# Patient Record
Sex: FEMALE | Race: BLACK OR AFRICAN AMERICAN | Employment: PART TIME | URBAN - METROPOLITAN AREA
[De-identification: names, ages, dates, MRNs, and addresses within clinical notes are randomized per-mention and may not be internally consistent; named-entity substitution may affect disease eponyms.]

---
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
## 2020-03-11 ENCOUNTER — OFFICE VISIT (OUTPATIENT)
Dept: OBGYN CLINIC | Age: 20
End: 2020-03-11

## 2020-03-11 VITALS
DIASTOLIC BLOOD PRESSURE: 81 MMHG | WEIGHT: 144.6 LBS | HEART RATE: 72 BPM | BODY MASS INDEX: 25.62 KG/M2 | SYSTOLIC BLOOD PRESSURE: 119 MMHG | RESPIRATION RATE: 16 BRPM | HEIGHT: 63 IN

## 2020-03-11 DIAGNOSIS — N92.0 MENORRHAGIA WITH REGULAR CYCLE: ICD-10-CM

## 2020-03-11 DIAGNOSIS — Z30.46 NEXPLANON REMOVAL: Primary | ICD-10-CM

## 2020-03-11 RX ORDER — TRANEXAMIC ACID 650 1/1
1300 TABLET ORAL 3 TIMES DAILY
Qty: 30 TAB | Refills: 12 | Status: SHIPPED | OUTPATIENT
Start: 2020-03-11 | End: 2020-03-16

## 2020-03-11 NOTE — PROGRESS NOTES
23 y.o. for Nexplanon removal.    Patient with Nexplanon placed April 2017. She desires removal because she doesn't like the way it feels and causes her irregular bleeding. Patient declines contraception. Patient with heavy, painful periods prior to placement. Would like to avoid hormones if possible. Mother with endometriosis. Review of symptoms:  Constitutional: negative  Urinary: negative  CV: negative    Neuro: negative  Resp: negative   Psych: negative  GI: negative    Musculoskeletal: negative  GYN: per HPI    Integumentary: negative    Physical exam:    Gen: AOx3, NAD  Resp: No respiratory distress  GYN: Deferred    A/P  23 y.o. for Nexplanon removal.  - nexplanon removed today. - declines contraception.  - will try lysteda for menstrual control. If no improvement after 2-3 cycles, will return to discuss other options.     RTC PRN or for annual exam

## 2020-03-11 NOTE — PROCEDURES
Nexplanon removal    Oral and written consent obtained. Nexplanon was palpated beneath left arm. Area sterilized with betadine. Area anesthetized with 3 ml of 1% plain lidocaine. 5 mm incision made at distal end of device. Device grabbed and removed easily in normal fashion. Hemostasis was excellent. Area cleaned of betadine then bandaged with steristrips. Pressure dressing applied. Patient tolerated the procedure well and there were no complications.

## 2020-07-20 ENCOUNTER — TELEPHONE (OUTPATIENT)
Dept: OBGYN CLINIC | Age: 20
End: 2020-07-20

## 2020-07-20 NOTE — TELEPHONE ENCOUNTER
Called and spoke with patient this afternoon. Advised her per Trenda Lady if spotting gets worse or painful to make earlier US appt and labs. Kaitlin Marshall MD  You 1 minute ago (3:00 PM)       If just light spotting, would observe for now, this is common in first trimester. If anything more than light spotting or she has associated pain, we can possibly get her in for earlier ultrasound and/or beta HCG levels.

## 2020-07-20 NOTE — TELEPHONE ENCOUNTER
Patient called this afternoon. States she is about 2-3 weeks pregnant. She has an appt on 08/17/2020. She states she is spotting but very lightly, didn't notice til she wiped. She wants to know is this normal and if not should she be seen earlier    Please advise.

## 2020-07-30 ENCOUNTER — HOSPITAL ENCOUNTER (EMERGENCY)
Age: 20
Discharge: HOME | End: 2020-07-30
Attending: EMERGENCY MEDICINE | Admitting: EMERGENCY MEDICINE

## 2020-07-30 VITALS
RESPIRATION RATE: 16 BRPM | BODY MASS INDEX: 24.11 KG/M2 | DIASTOLIC BLOOD PRESSURE: 83 MMHG | TEMPERATURE: 98.2 F | SYSTOLIC BLOOD PRESSURE: 128 MMHG | WEIGHT: 131 LBS | OXYGEN SATURATION: 98 % | HEART RATE: 74 BPM | HEIGHT: 62 IN

## 2020-07-30 DIAGNOSIS — N30.00 ACUTE CYSTITIS WITHOUT HEMATURIA: ICD-10-CM

## 2020-07-30 DIAGNOSIS — O26.891 SHORTNESS OF BREATH DUE TO PREGNANCY IN FIRST TRIMESTER: Primary | ICD-10-CM

## 2020-07-30 DIAGNOSIS — R06.02 SHORTNESS OF BREATH DUE TO PREGNANCY IN FIRST TRIMESTER: Primary | ICD-10-CM

## 2020-07-30 LAB
ANION GAP SERPL CALC-SCNC: 11 MMOL/L (ref 5–15)
APPEARANCE UR: ABNORMAL
BACTERIA URNS QL MICRO: ABNORMAL /HPF
BASOPHILS # BLD: 0 K/UL (ref 0–0.1)
BASOPHILS NFR BLD: 1 % (ref 0–1)
BILIRUB UR QL: NEGATIVE
BUN SERPL-MCNC: 9 MG/DL (ref 6–20)
BUN/CREAT SERPL: 11 (ref 12–20)
CALCIUM SERPL-MCNC: 9.1 MG/DL (ref 8.5–10.1)
CHLORIDE SERPL-SCNC: 102 MMOL/L (ref 97–108)
CO2 SERPL-SCNC: 24 MMOL/L (ref 21–32)
COLOR UR: ABNORMAL
CREAT SERPL-MCNC: 0.84 MG/DL (ref 0.55–1.02)
DIFFERENTIAL METHOD BLD: ABNORMAL
EOSINOPHIL # BLD: 0 K/UL (ref 0–0.4)
EOSINOPHIL NFR BLD: 1 % (ref 0–7)
EPITH CASTS URNS QL MICRO: ABNORMAL /LPF
ERYTHROCYTE [DISTWIDTH] IN BLOOD BY AUTOMATED COUNT: 13.2 % (ref 11.5–14.5)
GLUCOSE SERPL-MCNC: 79 MG/DL (ref 65–100)
GLUCOSE UR STRIP.AUTO-MCNC: NEGATIVE MG/DL
HCG SERPL-ACNC: ABNORMAL MIU/ML (ref 0–6)
HCG UR QL: POSITIVE
HCT VFR BLD AUTO: 38.6 % (ref 35–47)
HGB BLD-MCNC: 13.3 G/DL (ref 11.5–16)
HGB UR QL STRIP: NEGATIVE
IMM GRANULOCYTES # BLD AUTO: 0 K/UL (ref 0–0.04)
IMM GRANULOCYTES NFR BLD AUTO: 0 % (ref 0–0.5)
KETONES UR QL STRIP.AUTO: >80 MG/DL
LEUKOCYTE ESTERASE UR QL STRIP.AUTO: ABNORMAL
LYMPHOCYTES # BLD: 2.2 K/UL (ref 0.8–3.5)
LYMPHOCYTES NFR BLD: 35 % (ref 12–49)
MCH RBC QN AUTO: 29.8 PG (ref 26–34)
MCHC RBC AUTO-ENTMCNC: 34.5 G/DL (ref 30–36.5)
MCV RBC AUTO: 86.5 FL (ref 80–99)
MONOCYTES # BLD: 1 K/UL (ref 0–1)
MONOCYTES NFR BLD: 15 % (ref 5–13)
NEUTS SEG # BLD: 3 K/UL (ref 1.8–8)
NEUTS SEG NFR BLD: 48 % (ref 32–75)
NITRITE UR QL STRIP.AUTO: POSITIVE
NRBC # BLD: 0 K/UL (ref 0–0.01)
NRBC BLD-RTO: 0 PER 100 WBC
PH UR STRIP: 6 [PH] (ref 5–8)
PLATELET # BLD AUTO: 265 K/UL (ref 150–400)
PMV BLD AUTO: 10 FL (ref 8.9–12.9)
POTASSIUM SERPL-SCNC: 3.8 MMOL/L (ref 3.5–5.1)
PROT UR STRIP-MCNC: NEGATIVE MG/DL
RBC # BLD AUTO: 4.46 M/UL (ref 3.8–5.2)
RBC #/AREA URNS HPF: ABNORMAL /HPF (ref 0–5)
SODIUM SERPL-SCNC: 137 MMOL/L (ref 136–145)
SP GR UR REFRACTOMETRY: 1.02 (ref 1–1.03)
UA: UC IF INDICATED,UAUC: ABNORMAL
UROBILINOGEN UR QL STRIP.AUTO: 1 EU/DL (ref 0.2–1)
WBC # BLD AUTO: 6.4 K/UL (ref 3.6–11)
WBC URNS QL MICRO: ABNORMAL /HPF (ref 0–4)

## 2020-07-30 PROCEDURE — 36415 COLL VENOUS BLD VENIPUNCTURE: CPT

## 2020-07-30 PROCEDURE — 80048 BASIC METABOLIC PNL TOTAL CA: CPT

## 2020-07-30 PROCEDURE — 81025 URINE PREGNANCY TEST: CPT

## 2020-07-30 PROCEDURE — 74011250636 HC RX REV CODE- 250/636: Performed by: PHYSICIAN ASSISTANT

## 2020-07-30 PROCEDURE — 85025 COMPLETE CBC W/AUTO DIFF WBC: CPT

## 2020-07-30 PROCEDURE — 81001 URINALYSIS AUTO W/SCOPE: CPT

## 2020-07-30 PROCEDURE — 99284 EMERGENCY DEPT VISIT MOD MDM: CPT

## 2020-07-30 PROCEDURE — 84702 CHORIONIC GONADOTROPIN TEST: CPT

## 2020-07-30 RX ORDER — NITROFURANTOIN 25; 75 MG/1; MG/1
100 CAPSULE ORAL 2 TIMES DAILY
Qty: 14 CAP | Refills: 0 | Status: SHIPPED | OUTPATIENT
Start: 2020-07-30 | End: 2020-08-06

## 2020-07-30 RX ADMIN — SODIUM CHLORIDE 1000 ML: 900 INJECTION, SOLUTION INTRAVENOUS at 18:38

## 2020-07-30 NOTE — ED NOTES
Pt presents ambulatory to ED complaining of SOB upon exertion and while laying down to sleep. Pt reports taking 5 home pregnancy tests beginning 3 weeks ago, all positive. Pt denies vaginal discharge, vaginal bleeding, abd pain. Pt is alert and oriented x 4, RR even and unlabored, skin is warm and dry. Assesment completed and pt updated on plan of care. Emergency Department Nursing Plan of Care       The Nursing Plan of Care is developed from the Nursing assessment and Emergency Department Attending provider initial evaluation. The plan of care may be reviewed in the ED Provider note.     The Plan of Care was developed with the following considerations:   Patient / Family readiness to learn indicated by:verbalized understanding  Persons(s) to be included in education: patient  Barriers to Learning/Limitations:No    [address]    7/30/2020   4:57 PM

## 2020-07-30 NOTE — DISCHARGE INSTRUCTIONS
Patient Education        Shortness of Breath: Care Instructions  Your Care Instructions  Shortness of breath has many causes. Sometimes conditions such as anxiety can lead to shortness of breath. Some people get mild shortness of breath when they exercise. Trouble breathing also can be a symptom of a serious problem, such as asthma, lung disease, emphysema, heart problems, and pneumonia. If your shortness of breath continues, you may need tests and treatment. Watch for any changes in your breathing and other symptoms. Follow-up care is a key part of your treatment and safety. Be sure to make and go to all appointments, and call your doctor if you are having problems. It's also a good idea to know your test results and keep a list of the medicines you take. How can you care for yourself at home? · Do not smoke or allow others to smoke around you. If you need help quitting, talk to your doctor about stop-smoking programs and medicines. These can increase your chances of quitting for good. · Get plenty of rest and sleep. · Take your medicines exactly as prescribed. Call your doctor if you think you are having a problem with your medicine. · Find healthy ways to deal with stress. ? Exercise daily. ? Get plenty of sleep. ? Eat regularly and well. When should you call for help? [id number] anytime you think you may need emergency care. For example, call if:  · You have severe shortness of breath. · You have symptoms of a heart attack. These may include:  ? Chest pain or pressure, or a strange feeling in the chest.  ? Sweating. ? Shortness of breath. ? Nausea or vomiting. ? Pain, pressure, or a strange feeling in the back, neck, jaw, or upper belly or in one or both shoulders or arms. ? Lightheadedness or sudden weakness. ? A fast or irregular heartbeat. After you call 911, the  may tell you to chew 1 adult-strength or 2 to 4 low-dose aspirin. Wait for an ambulance.  Do not try to drive yourself. Call your doctor now or seek immediate medical care if:  · Your shortness of breath gets worse or you start to wheeze. Wheezing is a high-pitched sound when you breathe. · You wake up at night out of breath or have to prop your head up on several pillows to breathe. · You are short of breath after only light activity or while at rest.  Watch closely for changes in your health, and be sure to contact your doctor if:  · You do not get better over the next 1 to 2 days. Where can you learn more? Go to http://tomi-grabiel.info/  Enter S780 in the search box to learn more about \"Shortness of Breath: Care Instructions. \"  Current as of: 2020               Content Version: 12.5   [employer]. Care instructions adapted under license by [employer] (which disclaims liability or warranty for this information). If you have questions about a medical condition or this instruction, always ask your healthcare professional. [place] [address] any warranty or liability for your use of this information. Patient Education        Urinary Tract Infection in Pregnancy: Care Instructions  Your Care Instructions     A urinary tract infection, or UTI, is an infection of the bladder and other urinary structures. Most UTIs occur in the bladder. They often cause pain or burning when you urinate. UTI is the most common bacterial infection in pregnancy. If untreated, a UTI could lead to problems such as a kidney infection or  labor. Most UTIs can be cured with antibiotics. Your doctor will prescribe an antibiotic that is safe during pregnancy. Be sure to finish your medicine so that the infection does not spread to your kidneys. Follow-up care is a key part of your treatment and safety. Be sure to make and go to all appointments, and call your doctor if you are having problems.  It's also a good idea to know your test results and keep a list of the medicines you take. How can you care for yourself at home? · Take your antibiotics as directed. Do not stop taking them just because you feel better. You need to take the full course of antibiotics. · Drink extra water and other fluids for the next day or two. This will help wash out the bacteria causing the infection. If you have kidney, heart, or liver disease and have to limit fluids, talk with your doctor before you increase the amount of fluids you drink. · Do not drink alcohol. · Urinate often. Try to empty your bladder each time. Preventing UTIs  · Drink plenty of fluids, enough so that your urine is light yellow or clear like water. This helps you urinate often, which clears bacteria from your system. If you have kidney, heart, or liver disease and have to limit fluids, talk with your doctor before you increase the amount of fluids you drink. · Urinate when you first have the urge. · Urinate right after you have sex. This is the best way for women to avoid UTIs. · When going to the bathroom, wipe from front to back to keep bacteria from entering the vagina or urethra. When should you call for help? Call your doctor now or seek immediate medical care if:  · You have symptoms of a worse urinary tract infection. These may include:  ? Pain or burning when you urinate. ? A frequent need to urinate without being able to pass much urine. ? Pain in the flank, which is just below the rib cage and above the waist on either side of the back. ? Blood in your urine. ? A fever. Watch closely for changes in your health, and be sure to contact your doctor if:  · You do not get better as expected. Where can you learn more? Go to http://tomi-grabiel.info/  Enter M982 in the search box to learn more about \"Urinary Tract Infection in Pregnancy: Care Instructions. \"  Current as of: February 11, 2020               Content Version: 12.5  © 9510-7121 Healthwise, [facility].    Care instructions adapted under license by [employer] (which disclaims liability or warranty for this information). If you have questions about a medical condition or this instruction, always ask your healthcare professional. [address] any warranty or liability for your use of this information.

## 2020-07-30 NOTE — ED NOTES
Patient (s) was given copy of dc instructions and no paper script(s) and one electronic scripts. Patient (s) has verbalized understanding of instructions and script (s). Patient was given a current medication reconciliation form and verbalized understanding of their medications. Patient (s) has verbalized understanding of the importance of discussing medications with  his or her physician or clinic they will be following up with. Patient alert and oriented and in no acute distress. Patient offered wheelchair from treatment area to hospital entrance, patient declined wheelchair. Patient left ED with family.

## 2020-07-30 NOTE — ED TRIAGE NOTES
Pt reports she took a home pregnancy test that showed positive. Pt has not been able to follow up with OBGYN yet, unknown how far along. Pt reports increased SOB with exertion. Pt denies pain at this time. No obvious distress. First pregnancy for patient. Pt denies discharge/bleeding/cramping.

## 2020-07-30 NOTE — ED PROVIDER NOTES
EMERGENCY DEPARTMENT HISTORY AND PHYSICAL EXAM    Date: 7/30/2020  Patient Name: Cornell Clifford    History of Presenting Illness     Chief Complaint   Patient presents with    Shortness of Breath    Pregnancy Problem         History Provided By: Patient    HPI: Cornell Clifford is a 21 y.o. female with No significant past medical history who presents with shortness of breath x2 days associated with exertion and worse with lying down to sleep. Patient also reports taking several home pregnancy test about 3 weeks ago all of which were positive. Patient unsure last menstrual as her periods have been irregular and this is her first pregnancy. Patient has not seen OB yet but does have an initial appointment coming up. Patient denies any chest pain, cough, fever, chills, abdominal pain, vaginal bleeding or discharge. Patient does report associated nausea and vomiting with the pregnancy. Patient has not taken anything for symptoms. Patient states she used to smoke cigarettes but stopped once finding out that she was pregnant. PCP: None    Current Outpatient Medications   Medication Sig Dispense Refill    nitrofurantoin, macrocrystal-monohydrate, (Macrobid) 100 mg capsule Take 1 Cap by mouth two (2) times a day for 7 days. 14 Cap 0       Past History     Past Medical History:  Past Medical History:   Diagnosis Date    Bronchitis     10-15 years ago       Past Surgical History:  History reviewed. No pertinent surgical history. Family History:  History reviewed. No pertinent family history. Social History:  Social History     Tobacco Use    Smoking status: Never Smoker    Smokeless tobacco: Never Used   Substance Use Topics    Alcohol use: No    Drug use: Not Currently       Allergies:  No Known Allergies      Review of Systems   Review of Systems   Constitutional: Negative for chills and fever. Respiratory: Positive for shortness of breath. Negative for cough and wheezing.     Gastrointestinal: Negative for nausea and vomiting. Allergic/Immunologic: Negative for immunocompromised state. Neurological: Negative for speech difficulty and weakness. All other systems reviewed and are negative. Physical Exam     Vitals:    07/30/20 1626 07/30/20 1840 07/30/20 1842 07/30/20 1855   BP: (!) 122/92 128/83 128/83    Pulse: 76  74    Resp: 18  16    Temp: 98.2 °F (36.8 °C)      SpO2: 100%  100% 98%   Weight: 59.4 kg (131 lb)      Height: 5' 2\" (1.575 m)        Physical Exam  Vitals signs and nursing note reviewed. Constitutional:       General: She is not in acute distress. Appearance: She is well-developed. HENT:      Head: Normocephalic and atraumatic. Eyes:      Conjunctiva/sclera: Conjunctivae normal.   Cardiovascular:      Rate and Rhythm: Normal rate and regular rhythm. Heart sounds: Normal heart sounds. Pulmonary:      Effort: Pulmonary effort is normal. No tachypnea, bradypnea, accessory muscle usage or respiratory distress. Breath sounds: Normal breath sounds. No stridor. No decreased breath sounds, wheezing, rhonchi or rales. Skin:     General: Skin is warm and dry. Neurological:      Mental Status: She is alert and oriented to person, place, and time. Psychiatric:         Behavior: Behavior normal.         Thought Content:  Thought content normal.         Judgment: Judgment normal.           Diagnostic Study Results     Labs -     Recent Results (from the past 12 hour(s))   HCG URINE, QL. - POC    Collection Time: 07/30/20  5:03 PM   Result Value Ref Range    Pregnancy test,urine (POC) Positive (A) NEG     URINALYSIS W/ REFLEX CULTURE    Collection Time: 07/30/20  5:09 PM    Specimen: Urine   Result Value Ref Range    Color YELLOW/STRAW      Appearance CLOUDY (A) CLEAR      Specific gravity 1.025 1.003 - 1.030      pH (UA) 6.0 5.0 - 8.0      Protein Negative NEG mg/dL    Glucose Negative NEG mg/dL    Ketone >80 (A) NEG mg/dL    Bilirubin Negative NEG      Blood Negative NEG      Urobilinogen 1.0 0.2 - 1.0 EU/dL    Nitrites Positive (A) NEG      Leukocyte Esterase MODERATE (A) NEG      WBC 5-10 0 - 4 /hpf    RBC 0-5 0 - 5 /hpf    Epithelial cells MODERATE (A) FEW /lpf    Bacteria 4+ (A) NEG /hpf    UA:UC IF INDICATED CULTURE NOT INDICATED BY UA RESULT CNI     CBC WITH AUTOMATED DIFF    Collection Time: 07/30/20  5:38 PM   Result Value Ref Range    WBC 6.4 3.6 - 11.0 K/uL    RBC 4.46 3.80 - 5.20 M/uL    HGB 13.3 11.5 - 16.0 g/dL    HCT 38.6 35.0 - 47.0 %    MCV 86.5 80.0 - 99.0 FL    MCH 29.8 26.0 - 34.0 PG    MCHC 34.5 30.0 - 36.5 g/dL    RDW 13.2 11.5 - 14.5 %    PLATELET 494 394 - 371 K/uL    MPV 10.0 8.9 - 12.9 FL    NRBC 0.0 0  WBC    ABSOLUTE NRBC 0.00 0.00 - 0.01 K/uL    NEUTROPHILS 48 32 - 75 %    LYMPHOCYTES 35 12 - 49 %    MONOCYTES 15 (H) 5 - 13 %    EOSINOPHILS 1 0 - 7 %    BASOPHILS 1 0 - 1 %    IMMATURE GRANULOCYTES 0 0.0 - 0.5 %    ABS. NEUTROPHILS 3.0 1.8 - 8.0 K/UL    ABS. LYMPHOCYTES 2.2 0.8 - 3.5 K/UL    ABS. MONOCYTES 1.0 0.0 - 1.0 K/UL    ABS. EOSINOPHILS 0.0 0.0 - 0.4 K/UL    ABS. BASOPHILS 0.0 0.0 - 0.1 K/UL    ABS. IMM.  GRANS. 0.0 0.00 - 0.04 K/UL    DF AUTOMATED     METABOLIC PANEL, BASIC    Collection Time: 07/30/20  5:38 PM   Result Value Ref Range    Sodium 137 136 - 145 mmol/L    Potassium 3.8 3.5 - 5.1 mmol/L    Chloride 102 97 - 108 mmol/L    CO2 24 21 - 32 mmol/L    Anion gap 11 5 - 15 mmol/L    Glucose 79 65 - 100 mg/dL    BUN 9 6 - 20 MG/DL    Creatinine 0.84 0.55 - 1.02 MG/DL    BUN/Creatinine ratio 11 (L) 12 - 20      GFR est AA >60 >60 ml/min/1.73m2    GFR est non-AA >60 >60 ml/min/1.73m2    Calcium 9.1 8.5 - 10.1 MG/DL   BETA HCG, QT    Collection Time: 07/30/20  5:38 PM   Result Value Ref Range    Beta HCG, QT 28,642 (H) 0 - 6 MIU/ML       Radiologic Studies -   No orders to display     CT Results  (Last 48 hours)    None        CXR Results  (Last 48 hours)    None            Medical Decision Making   I am the first provider for this patient. I reviewed the vital signs, available nursing notes, past medical history, past surgical history, family history and social history. Vital Signs-Reviewed the patient's vital signs. Records Reviewed: Old Medical Records    ED Course as of Jul 30 1938 Thu Jul 30, 2020 2344 Discussed lab results with patient and advised of low likelihood of PE in the differential.  Discussed risk and benefits of getting a CTA while pregnant. Patient offered CTA now while in ED or she can return should symptoms worsen. Patient opts to not get CTA at this time but will return should shortness of breath get worse. Patient does report some improvement in SOB since being in ED. We will plan for discharge once IVF finish.    [AH]      ED Course User Index  [AH] Theresa Grace PA-C          Disposition:  Discharged    DISCHARGE NOTE:   1934      Care plan outlined and precautions discussed. Patient has no new complaints, changes, or physical findings. Results of labs and UA were reviewed with the patient. All medications were reviewed with the patient; will d/c home. All of pt's questions and concerns were addressed. Patient was instructed and agrees to follow up with OB, as well as to return to the ED upon further deterioration. Patient is ready to go home. Follow-up Information     Follow up With Specialties Details Why [address] [facility] EMERGENCY DEPT Emergency Medicine  If symptoms worsen [facility]    [address] [facility] [address] [facility],4[facility] [address]  [address]   [phone]          Current Discharge Medication List      START taking these medications    Details   nitrofurantoin, macrocrystal-monohydrate, (Macrobid) 100 mg capsule Take 1 Cap by mouth two (2) times a day for 7 days.   Qty: 14 Cap, Refills: 0         STOP taking these medications       ibuprofen (MOTRIN) 600 mg tablet Comments:   Reason for Stopping:               Provider Notes (Medical Decision Making):   Patient presents with SOB. DDx: Asthma PE, PNA, anemia, Anxiety, low concern for COVID-19. Will get labs and UA but no imaging at this time as patient is pregnant    Procedures:  Procedures    Please note that this dictation was completed with Dragon, computer voice recognition software. Quite often unanticipated grammatical, syntax, homophones, and other interpretive errors are inadvertently transcribed by the computer software. Please disregard these errors. Additionally, please excuse any errors that have escaped final proofreading. Diagnosis     Clinical Impression:   1. Shortness of breath due to pregnancy in first trimester    2.  Acute cystitis without hematuria

## 2020-07-30 NOTE — ED NOTES
Patient ambulated with pulse ox. Pt reports some shortness of breath but her pulse ox stayed around  most of the time and briefly went to 95% on room air.

## 2020-07-30 NOTE — ED NOTES
Bedside and Verbal shift change report given to Jorge L Altman RN  (oncoming nurse) by Tripp Sanchez RN  (offgoing nurse). Report included the following information SBAR, ED Summary, MAR and Recent Results.

## 2020-08-17 ENCOUNTER — OFFICE VISIT (OUTPATIENT)
Dept: OBGYN CLINIC | Age: 20
End: 2020-08-17

## 2020-08-17 VITALS
WEIGHT: 138 LBS | BODY MASS INDEX: 24.45 KG/M2 | HEIGHT: 63 IN | SYSTOLIC BLOOD PRESSURE: 120 MMHG | DIASTOLIC BLOOD PRESSURE: 76 MMHG

## 2020-08-17 DIAGNOSIS — O20.0 THREATENED ABORTION: Primary | ICD-10-CM

## 2020-08-17 PROCEDURE — 76801 OB US < 14 WKS SINGLE FETUS: CPT | Performed by: OBSTETRICS & GYNECOLOGY

## 2020-08-17 PROCEDURE — 99213 OFFICE O/P EST LOW 20 MIN: CPT | Performed by: OBSTETRICS & GYNECOLOGY

## 2020-08-17 NOTE — PATIENT INSTRUCTIONS
Threatened Miscarriage: Care Instructions Your Care Instructions Some women have light spotting or bleeding during the first 12 weeks of pregnancy. In some cases this is normal. Light spotting or bleeding can also be a sign of a possible loss of the pregnancy. This is called a threatened miscarriage. At this point, the doctor may not be able to tell if your vaginal bleeding is normal or is a sign of a miscarriage. In early pregnancy, things such as stress, exercise, and sex do not cause miscarriage. You may be worried or upset about the possibility of losing your pregnancy. But do not blame yourself. There is no treatment to stop a threatened miscarriage. If you do have a miscarriage, there was nothing you could have done to prevent it. A miscarriage usually means that the pregnancy is not developing normally. The doctor has checked you carefully, but problems can develop later. If you notice any problems or new symptoms, get medical treatment right away. Follow-up care is a key part of your treatment and safety. Be sure to make and go to all appointments, and call your doctor if you are having problems. It's also a good idea to know your test results and keep a list of the medicines you take. How can you care for yourself at home? · If you do have a miscarriage, you will probably have some vaginal bleeding for 1 to 2 weeks. Use pads instead of tampons. · Take acetaminophen (Tylenol) for cramps. Read and follow all instructions on the label. · Do not take two or more pain medicines at the same time unless the doctor told you to. Many pain medicines have acetaminophen, which is Tylenol. Too much acetaminophen (Tylenol) can be harmful. · Do not have sex until your doctor says it is okay. · Get lots of rest over the next several days. · You may do your normal activities if you feel well enough to do them. But do not do any heavy exercise until your doctor says it is okay. · Eat a balanced diet that is high in iron and vitamin C. Foods rich in iron include red meat, shellfish, eggs, beans, and leafy green vegetables. Foods high in vitamin C include citrus fruits, tomatoes, and broccoli. Talk to your doctor about whether you need to take iron pills or a multivitamin. · Do not drink alcohol or use tobacco or illegal drugs. · Do not smoke. If you need help quitting, talk to your doctor about stop-smoking programs and medicines. These can increase your chances of quitting for good. When should you call for help? [id number] anytime you think you may need emergency care. For example, call if: 
· You passed out (lost consciousness). Call your doctor now or seek immediate medical care if: 
· You have severe vaginal bleeding. · You are dizzy or lightheaded, or you feel like you may faint. · You have new or worse pain in your belly or pelvis. · You have a fever. · You have vaginal discharge that smells bad. Watch closely for changes in your health, and be sure to contact your doctor if: 
· You do not get better as expected. Where can you learn more? Go to http://www.gray.com/ Enter E287 in the search box to learn more about \"Threatened Miscarriage: Care Instructions. \" Current as of: February 11, 2020               Content Version: 12.5 © 3797-5876 [employer]wise[employer]. Care instructions adapted under license by [employer] (which disclaims liability or warranty for this information). If you have questions about a medical condition or this instruction, always ask your healthcare professional. [place] [address] any warranty or liability for your use of this information.

## 2020-08-17 NOTE — PROGRESS NOTES
Initial OB Problem Visit    Rudi Keating is a 21 y.o. at [address] by LMP measuring 6 weeks by ultrasound (with no FHTs) presenting for initial OB visit. Pt with slight spotting last week, also with some N/V. But otherwise doing well. Beta HCG 03,442 on 8/1/20 at [address] where she presented for SOB. TV ULTRASOUND PERFORMED  A SINGLE NONVIABLE 6W0D IUP IS SEEN WITH ABSENT CARDIAC RHYTHM. POSSIBILITY OF SECOND FETUS  TOO EARLY TO DETERMINE GESTATIONAL AGE, WITH NO FETAL HEART TONES. CLINICAL COORELATION  RECOMMENDED. GESTATIONAL AGE BASED ON TODAYS ULTRASOUND. A NORMAL YOLK SAC IS SEEN. RIGHT OVARY APPEARS WITHIN NORMAL LIMITS. LEFT OVARY APPEARS WITHIN NORMAL LIMITS. NO FREE FLUID IS SEEN IN THE CDS. Pregnancy symptoms:  -N/V?yes  -breast tenderness?denies  -fatigue?denies  -cramping?denies  -weight change?denies  -Vaginal bleeding?spotting last wednesday    Ob/Gyn Hx:  G1   LMP- Date: 6/16/20 sure  Menstrual pattern prior to conception:irregular  Contraception at time of conception?none, nexplanon removed 3/11/20  Date of 1st positive UPT:3 weeks ago  STI-denies    Health maintenance:  Pap-not indicated  Gardasil-denies     Past Medical History:   Diagnosis Date    Bronchitis     10-15 years ago     No past surgical history on file. No family history on file.   Social History     Socioeconomic History    Marital status: SINGLE     Spouse name: Not on file    Number of children: Not on file    Years of education: Not on file    Highest education level: Not on file   Occupational History    Not on file   Social Needs    Financial resource strain: Not on file    Food insecurity     Worry: Not on file     Inability: Not on file    Transportation needs     Medical: Not on file     Non-medical: Not on file   Tobacco Use    Smoking status: Never Smoker    Smokeless tobacco: Never Used   Substance and Sexual Activity    Alcohol use: No    Drug use: Not Currently    Sexual activity: Yes     Birth control/protection: Inserts   Lifestyle    Physical activity     Days per week: Not on file     Minutes per session: Not on file    Stress: Not on file   Relationships    Social connections     Talks on phone: Not on file     Gets together: Not on file     Attends Baptism service: Not on file     Active member of club or organization: Not on file     Attends meetings of clubs or organizations: Not on file     Relationship status: Not on file    Intimate partner violence     Fear of current or ex partner: Not on file     Emotionally abused: Not on file     Physically abused: Not on file     Forced sexual activity: Not on file   Other Topics Concern    Not on file   Social History Narrative    Not on file         No Known Allergies      Review of Systems - History obtained from the patient  Constitutional: negative for weight loss, fever, night sweats  HEENT: negative for hearing loss, earache, congestion, snoring, sorethroat  CV: negative for chest pain, palpitations, edema  Resp: negative for cough, shortness of breath, wheezing  GI: negative for change in bowel habits, abdominal pain, black or bloody stools  : negative for frequency, dysuria, hematuria, vaginal discharge, +spotting  MSK: negative for back pain, joint pain, muscle pain  Breast: negative for breast lumps, nipple discharge, galactorrhea  Skin :negative for itching, rash, hives  Neuro: negative for dizziness, headache, confusion, weakness  Psych: negative for anxiety, depression, change in mood  Heme/lymph: negative for bleeding, bruising, pallor    Physical Exam  Visit Vitals  /76 (BP 1 Location: Left arm, BP Patient Position: Sitting)   Ht 5' 3\" (1.6 m)   Wt 138 lb (62.6 kg)   LMP 06/16/2020   BMI 24.45 kg/m²     Constitutional  · Appearance: well-nourished, well developed, alert, in no acute distress    HENT  · Head and Face: appears normal    Neck  · Inspection/Palpation: normal appearance, no masses or tenderness  · Lymph Nodes: no lymphadenopathy present  · Thyroid: gland size normal, nontender, no nodules or masses present on palpation    Chest  · Respiratory Effort: non-labored breathing  · Auscultation: CTAB, normal breath sounds    Cardiovascular  · Heart:  · Auscultation: regular rate and rhythm without murmur  · Extremities: no peripheral edema    Gastrointestinal  · Abdominal Examination: abdomen non-tender to palpation, normal bowel sounds, no masses present  · Liver and spleen: no hepatomegaly present, spleen not palpable  · Hernias: no hernias identified    Skin  · General Inspection: no rash, no lesions identified    Neurologic/Psychiatric  · Mental Status:  · Orientation: grossly oriented to person, place and time  · Mood and Affect: mood normal, affect appropriate      Assessment/Plan:  22 yo G1 with suspected missed ab, but not yet meeting diagnostic criteria for failed pregnancy, thus cannot definitively exclude earlier gestation than expected.     -beta HCG, T&S  -repeat US in 2 wks  -discussed concern for pregnancy failure in this patient  -provided specimen collection supplies in event pt spontaneously passes pregnancy  -bleeding/pain/infx precautions    RTC 2 wk    Danii Lopez MD  8/17/2020  2:14 PM

## 2020-08-18 LAB
ABO GROUP BLD: NORMAL
HCG INTACT+B SERPL-ACNC: NORMAL MIU/ML
RH BLD: POSITIVE

## 2020-08-18 NOTE — PROGRESS NOTES
HCG increased from prior assessment, but still not as much as we might expect, still suspicion for viable pregnancy low, but we will repeat ultrasound in 2 weeks as scheduled to be sure.

## 2020-08-26 ENCOUNTER — TELEPHONE (OUTPATIENT)
Dept: OBGYN CLINIC | Age: 20
End: 2020-08-26

## 2020-08-26 NOTE — TELEPHONE ENCOUNTER
Call received at 348PM    21year old patient seen in the office on 8/17/2020 for threatened ab      Patient calling today to ask for a note for work due to not going to work on 8/19-8-20 due to mild cramping and light spotting. Patient states she was told to take it easy and not do any thing strenuous. Patient reports she continues to have off and on spotting. Patient did go back to work    ? Ok to write the letter.     Please advise

## 2020-08-26 NOTE — TELEPHONE ENCOUNTER
MD Maria E Joe, Clarence Gallagher, RN    Caller: Unspecified (Today,  2:48 PM)               Okay to write note, and please also schedule pt for repeat US and follow up visit in 1 week. Thanks        Letter composed, printed, signed and scanned into chart. Letter was sent to patient as attachment to her my chart.       Patient placed on the schedule for ultrasound on 9/3/2020 at 12:30pM and then follow up at 1:00PM    Please confirm that this is ok ( double booked)    Please advised so this nurse can confirm- with patient

## 2020-08-26 NOTE — LETTER
8/26/2020 4:27 PM 
 
Ms. Cornell Loza[address] To whom it may concern: Ms Milana Dickey is under my care and may be excused from work from 8/19- 8/22/2020 . If you have any further questions , placed have the patient to contact the office at [phone]. Sincerely, Shahla Sotomayor MD

## 2020-08-27 NOTE — TELEPHONE ENCOUNTER
MD Maria E Longo Earlean Antonio, RN    Caller: Unspecified (Yesterday,  2:48 PM)               Yes, okay to double book. This nurse attempted to reach the patient and left a detailed message confirming the appointments scheduled for 9/3/2020 at 12:30PM for ultrasound and then follow up at 1:00PM    Patient verbalized understanding.

## 2020-09-15 ENCOUNTER — HOSPITAL ENCOUNTER (EMERGENCY)
Age: 20
Discharge: HOME | End: 2020-09-15
Attending: EMERGENCY MEDICINE | Admitting: EMERGENCY MEDICINE
Payer: COMMERCIAL

## 2020-09-15 VITALS
HEART RATE: 82 BPM | BODY MASS INDEX: 25.69 KG/M2 | SYSTOLIC BLOOD PRESSURE: 128 MMHG | HEIGHT: 63 IN | TEMPERATURE: 98.9 F | RESPIRATION RATE: 16 BRPM | WEIGHT: 145 LBS | DIASTOLIC BLOOD PRESSURE: 85 MMHG | OXYGEN SATURATION: 99 %

## 2020-09-15 DIAGNOSIS — N30.01 ACUTE CYSTITIS WITH HEMATURIA: Primary | ICD-10-CM

## 2020-09-15 DIAGNOSIS — O03.4 RETAINED PRODUCTS OF CONCEPTION AFTER MISCARRIAGE: ICD-10-CM

## 2020-09-15 LAB
APPEARANCE UR: ABNORMAL
BACTERIA URNS QL MICRO: ABNORMAL /HPF
BILIRUB UR QL: NEGATIVE
COLOR UR: ABNORMAL
EPITH CASTS URNS QL MICRO: ABNORMAL /LPF
GLUCOSE UR STRIP.AUTO-MCNC: NEGATIVE MG/DL
HCG UR QL: POSITIVE
HGB UR QL STRIP: ABNORMAL
KETONES UR QL STRIP.AUTO: 40 MG/DL
LEUKOCYTE ESTERASE UR QL STRIP.AUTO: NEGATIVE
NITRITE UR QL STRIP.AUTO: POSITIVE
PH UR STRIP: 6 [PH] (ref 5–8)
PROT UR STRIP-MCNC: >300 MG/DL
RBC #/AREA URNS HPF: >100 /HPF (ref 0–5)
SP GR UR REFRACTOMETRY: 1.02 (ref 1–1.03)
UA: UC IF INDICATED,UAUC: ABNORMAL
UROBILINOGEN UR QL STRIP.AUTO: 1 EU/DL (ref 0.2–1)
WBC URNS QL MICRO: ABNORMAL /HPF (ref 0–4)

## 2020-09-15 PROCEDURE — 99284 EMERGENCY DEPT VISIT MOD MDM: CPT

## 2020-09-15 PROCEDURE — 81025 URINE PREGNANCY TEST: CPT

## 2020-09-15 PROCEDURE — 81001 URINALYSIS AUTO W/SCOPE: CPT

## 2020-09-15 RX ORDER — CEPHALEXIN 500 MG/1
500 CAPSULE ORAL 2 TIMES DAILY
Qty: 10 CAP | Refills: 0 | Status: SHIPPED | OUTPATIENT
Start: 2020-09-15 | End: 2021-09-11

## 2020-09-15 NOTE — ED NOTES
Discharge instructions were given to the patient by Gaudencio Bolton RN. The patient left the Emergency Department ambulatory, alert and oriented and in no acute distress with 0 prescriptions. The patient was encouraged to call or return to the ED for worsening issues or problems and was encouraged to schedule a follow up appointment for continuing care. The patient verbalized understanding of discharge instructions and prescriptions, all questions were answered. The patient has no further concerns at this time.

## 2020-09-15 NOTE — ED PROVIDER NOTES
EMERGENCY DEPARTMENT HISTORY AND PHYSICAL EXAM      Date: 9/15/2020  Patient Name: Karlee Theodore    History of Presenting Illness     Chief Complaint   Patient presents with    Vaginal Bleeding       History Provided By: Patient    HPI: Karlee Theodore, 21 y.o. female presents ambulatory to the ED with cc of mild to moderate vaginal bleeding since today. Pt reports passing clots. States had a miscarriage 2 weeks ago. Denies any associated symptoms. Denies any alleviating or exacerbating factors. States she has an appointment with her OBGYN tomorrow at 8 AM. Pt specifically denies any fever, chills, CP, SOB, abd pain, NVD. There are no other complaints, changes, or physical findings at this time. PCP: None    No current facility-administered medications on file prior to encounter. Current Outpatient Medications on File Prior to Encounter   Medication Sig Dispense Refill    PNV Comb #2-Iron-Omega 3-FA [id number] mg cmpk Take  by mouth. Past History     Past Medical History:  Past Medical History:   Diagnosis Date     2020    PO    Bronchitis     10-15 years ago       Past Surgical History:  Past Surgical History:   Procedure Laterality Date    HX WISDOM TEETH EXTRACTION         Family History:  Family History   Problem Relation Age of Onset    No Known Problems Mother     No Known Problems Father        Social History:  Social History     Tobacco Use    Smoking status: Never Smoker    Smokeless tobacco: Never Used   Substance Use Topics    Alcohol use: No    Drug use: Not Currently       Allergies:  No Known Allergies      Review of Systems   Review of Systems   Constitutional: Negative for fever. HENT: Negative for sore throat. Eyes: Negative for photophobia and redness. Respiratory: Negative for shortness of breath and wheezing. Cardiovascular: Negative for chest pain and leg swelling.    Gastrointestinal: Negative for abdominal pain, blood in stool, nausea and vomiting. Genitourinary: Positive for vaginal bleeding. Negative for difficulty urinating, dysuria, hematuria and menstrual problem. Musculoskeletal: Negative for back pain and joint swelling. Neurological: Negative for dizziness, seizures, syncope, speech difficulty, weakness, numbness and headaches. Hematological: Negative for adenopathy. Psychiatric/Behavioral: Negative for agitation, confusion and suicidal ideas. The patient is not nervous/anxious. Physical Exam   Physical Exam  Vitals signs and nursing note reviewed. Constitutional:       General: She is not in acute distress. Appearance: She is well-developed. HENT:      Head: Normocephalic and atraumatic. Eyes:      Conjunctiva/sclera: Conjunctivae normal.      Pupils: Pupils are equal, round, and reactive to light. Neck:      Musculoskeletal: Normal range of motion and neck supple. Cardiovascular:      Rate and Rhythm: Normal rate and regular rhythm. Heart sounds: Normal heart sounds. Pulmonary:      Effort: Pulmonary effort is normal. No respiratory distress. Breath sounds: Normal breath sounds. No wheezing. Abdominal:      General: Bowel sounds are normal. There is no distension. Palpations: Abdomen is soft. Tenderness: There is no abdominal tenderness. Musculoskeletal: Normal range of motion. General: No tenderness. Skin:     General: Skin is warm and dry. Findings: No rash. Neurological:      Mental Status: She is alert and oriented to person, place, and time. Cranial Nerves: No cranial nerve deficit.    Psychiatric:         Behavior: Behavior normal.         Diagnostic Study Results     Labs -     Recent Results (from the past 12 hour(s))   URINALYSIS W/ REFLEX CULTURE    Collection Time: 09/15/20  6:31 PM    Specimen: Urine   Result Value Ref Range    Color RED      Appearance CLOUDY (A) CLEAR      Specific gravity 1.025 1.003 - 1.030      pH (UA) 6.0 5.0 - 8.0      Protein >300 (A) NEG mg/dL    Glucose Negative NEG mg/dL    Ketone 40 (A) NEG mg/dL    Bilirubin Negative NEG      Blood LARGE (A) NEG      Urobilinogen 1.0 0.2 - 1.0 EU/dL    Nitrites Positive (A) NEG      Leukocyte Esterase Negative NEG      WBC 0-4 0 - 4 /hpf    RBC >100 (H) 0 - 5 /hpf    Epithelial cells FEW FEW /lpf    Bacteria 3+ (A) NEG /hpf    UA:UC IF INDICATED CULTURE NOT INDICATED BY UA RESULT CNI     HCG URINE, QL. - POC    Collection Time: 09/15/20  6:33 PM   Result Value Ref Range    Pregnancy test,urine (POC) Positive (A) NEG         Medical Decision Making   I am the first provider for this patient. I reviewed the vital signs, available nursing notes, past medical history, past surgical history, family history and social history. Vital Signs-Reviewed the patient's vital signs. Patient Vitals for the past 12 hrs:   Temp Pulse Resp BP SpO2   09/15/20 1802 98.9 °F (37.2 °C) 85 16 135/88 100 %       Records Reviewed: Nursing Notes and Old Medical Records    Provider Notes (Medical Decision Making):   DDx: DUB, retained products of conception    ED Course:   Initial assessment performed. The patients presenting problems have been discussed, and they are in agreement with the care plan formulated and outlined with them. I have encouraged them to ask questions as they arise throughout their visit. Critical Care Time:   none    Disposition:  DISCHARGE  6:54 PM  The patient has been re-evaluated and is ready for discharge. Reviewed available results with patient. Counseled pt on diagnosis and care plan. Pt has expressed understanding, and all questions have been answered. Pt agrees with plan and agrees to follow up as recommended, or return to the ED if their symptoms worsen. Discharge instructions have been provided and explained to the pt, along with reasons to return to the ED. PLAN:  1.    Current Discharge Medication List      START taking these medications    Details   cephALEXin (Keflex) 500 mg capsule Take 1 Cap by mouth two (2) times a day. Qty: 10 Cap, Refills: 0           2. Follow-up Information     Follow up With Specialties Details Why [address] [facility] EMERGENCY DEPT Emergency Medicine  If symptoms worsen [address]        Return to ED if worse     Diagnosis     Clinical Impression:   1. Acute cystitis with hematuria    2. Retained products of conception after miscarriage        Attestations: This note is prepared by Melchor See, acting as Scribe for Debbie Gomez MD.    Debbie Gomez MD: The scribe's documentation has been prepared under my direction and personally reviewed by me in its entirety. I confirm that the note above accurately reflects all work, treatment, procedures, and medical decision making performed by me.

## 2020-09-15 NOTE — ED NOTES
Pt presents to ED ambulatory complaining of lower abdominal pain bright red vaginal bleeding with 3 small clots today. Pt reports pill induced  2 wks ago. Pt reports appt with obgyn tomorrow. Pt denies dysuria, dizziness, n/v, fever, chills. Pt is alert and oriented x 4, RR even and unlabored, skin is warm and dry. Assessment completed and pt updated on plan of care. Call bell in reach. Emergency Department Nursing Plan of Care       The Nursing Plan of Care is developed from the Nursing assessment and Emergency Department Attending provider initial evaluation. The plan of care may be reviewed in the ED Provider note.     The Plan of Care was developed with the following considerations:   Patient / Family readiness to learn indicated by:verbalized understanding  Persons(s) to be included in education: patient  Barriers to Learning/Limitations:No    Signed     Digna Beltran    9/15/2020   6:21 PM

## 2020-09-15 NOTE — ED TRIAGE NOTES
Patient presents to the ED with c/o vaginal bleeding after a miscarriage x2 weeks. Pt reports that she has passed three large clots today. Pt reports that she took a medication to have the miscarriage. Pt reports abdominal cramping.

## 2020-09-17 NOTE — PERIOP NOTES
[facility]  Preoperative Instructions        Surgery Date 9/18/20          Time of Arrival 1000    1. On the day of your surgery, please report to the Surgical Services Registration Desk and sign in at your designated time. The Surgery Center is located to the right of the Emergency Room. 2. You must have someone with you to drive you home. You should not drive a car for 24 hours following surgery. Please make arrangements for a friend or family member to stay with you for the first 24 hours after your surgery. 3. Do not have anything to eat or drink (including water, gum, mints, coffee, juice) after midnight ? 9/17/20? Daniel Angry ? This may not apply to medications prescribed by your physician. ?(Please note below the special instructions with medications to take the morning of your procedure.)    4. We recommend you do not drink any alcoholic beverages for 24 hours before and after your surgery. 5. Contact your surgeons office for instructions on the following medications: non-steroidal anti-inflammatory drugs (i.e. Advil, Aleve), vitamins, and supplements. (Some surgeons will want you to stop these medications prior to surgery and others may allow you to take them)  **If you are currently taking Plavix, Coumadin, Aspirin and/or other blood-thinning agents, contact your surgeon for instructions. ** Your surgeon will partner with the physician prescribing these medications to determine if it is safe to stop or if you need to continue taking. Please do not stop taking these medications without instructions from your surgeon    6. Wear comfortable clothes. Wear glasses instead of contacts. Do not bring any money or jewelry. Please bring picture ID, insurance card, and any prearranged co-payment or hospital payment. Do not wear make-up, particularly mascara the morning of your surgery. Do not wear nail polish, particularly if you are having foot /hand surgery.   Wear your hair loose or down, no ponytails, buns, jenise pins or clips. All body piercings must be removed. Please shower with antibacterial soap for three consecutive days before and on the morning of surgery, but do not apply any lotions, powders or deodorants after the shower on the day of surgery. Please use a fresh towels after each shower. Please sleep in clean clothes and change bed linens the night before surgery. Please do not shave for 48 hours prior to surgery. Shaving of the face is acceptable. 7. You should understand that if you do not follow these instructions your surgery may be cancelled. If your physical condition changes (I.e. fever, cold or flu) please contact your surgeon as soon as possible. 8. It is important that you be on time. If a situation occurs where you may be late, please call [phone] (OR Holding Area). 9. If you have any questions and or problems, please call [phone] (Pre-admission Testing). 10. Your surgery time may be subject to change. You will receive a phone call the evening prior if your time changes. 11.  If having outpatient surgery, you must have someone to drive you here, stay with you during the duration of your stay, and to drive you home at time of discharge. Special Instructions:     TAKE ALL MEDICATIONS DAY OF SURGERY EXCEPT:  none    I understand a pre-operative phone call will be made to verify my surgery time. In the event that I am not available, I give permission for a message to be left on my answering service and/or with another person?   Yes           ___________________      __________   _________    (Signature of Patient)             (Witness)                (Date and Time)

## 2020-09-18 ENCOUNTER — ANESTHESIA EVENT (OUTPATIENT)
Dept: SURGERY | Age: 20
End: 2020-09-18
Payer: COMMERCIAL

## 2020-09-18 ENCOUNTER — ANESTHESIA (OUTPATIENT)
Dept: SURGERY | Age: 20
End: 2020-09-18
Payer: COMMERCIAL

## 2020-09-18 ENCOUNTER — HOSPITAL ENCOUNTER (OUTPATIENT)
Age: 20
Setting detail: OUTPATIENT SURGERY
Discharge: HOME | End: 2020-09-18
Attending: OBSTETRICS & GYNECOLOGY | Admitting: OBSTETRICS & GYNECOLOGY
Payer: COMMERCIAL

## 2020-09-18 VITALS
HEART RATE: 60 BPM | BODY MASS INDEX: 25.16 KG/M2 | HEIGHT: 63 IN | RESPIRATION RATE: 16 BRPM | DIASTOLIC BLOOD PRESSURE: 64 MMHG | SYSTOLIC BLOOD PRESSURE: 105 MMHG | WEIGHT: 141.98 LBS | OXYGEN SATURATION: 100 % | TEMPERATURE: 98 F

## 2020-09-18 LAB
ABO + RH BLD: NORMAL
BLOOD GROUP ANTIBODIES SERPL: NORMAL
SPECIMEN EXP DATE BLD: NORMAL

## 2020-09-18 PROCEDURE — 74011250636 HC RX REV CODE- 250/636: Performed by: REGISTERED NURSE

## 2020-09-18 PROCEDURE — 77030019905 HC CATH URETH INTMIT MDII -A: Performed by: OBSTETRICS & GYNECOLOGY

## 2020-09-18 PROCEDURE — 2709999900 HC NON-CHARGEABLE SUPPLY: Performed by: OBSTETRICS & GYNECOLOGY

## 2020-09-18 PROCEDURE — 74011000250 HC RX REV CODE- 250: Performed by: REGISTERED NURSE

## 2020-09-18 PROCEDURE — 76010000138 HC OR TIME 0.5 TO 1 HR: Performed by: OBSTETRICS & GYNECOLOGY

## 2020-09-18 PROCEDURE — 74011250636 HC RX REV CODE- 250/636: Performed by: ANESTHESIOLOGY

## 2020-09-18 PROCEDURE — 77030003666 HC NDL SPINAL BD -A: Performed by: OBSTETRICS & GYNECOLOGY

## 2020-09-18 PROCEDURE — 74011250636 HC RX REV CODE- 250/636

## 2020-09-18 PROCEDURE — 77030008578 HC TBNG UTER SUC BUSS -A: Performed by: OBSTETRICS & GYNECOLOGY

## 2020-09-18 PROCEDURE — 76060000032 HC ANESTHESIA 0.5 TO 1 HR: Performed by: OBSTETRICS & GYNECOLOGY

## 2020-09-18 PROCEDURE — 74011250637 HC RX REV CODE- 250/637: Performed by: OBSTETRICS & GYNECOLOGY

## 2020-09-18 PROCEDURE — 77030009368: Performed by: OBSTETRICS & GYNECOLOGY

## 2020-09-18 PROCEDURE — 76210000006 HC OR PH I REC 0.5 TO 1 HR: Performed by: OBSTETRICS & GYNECOLOGY

## 2020-09-18 PROCEDURE — 88305 TISSUE EXAM BY PATHOLOGIST: CPT

## 2020-09-18 PROCEDURE — 86900 BLOOD TYPING SEROLOGIC ABO: CPT

## 2020-09-18 PROCEDURE — 76210000021 HC REC RM PH II 0.5 TO 1 HR: Performed by: OBSTETRICS & GYNECOLOGY

## 2020-09-18 PROCEDURE — 74011000250 HC RX REV CODE- 250: Performed by: OBSTETRICS & GYNECOLOGY

## 2020-09-18 PROCEDURE — 36415 COLL VENOUS BLD VENIPUNCTURE: CPT

## 2020-09-18 RX ORDER — LIDOCAINE HYDROCHLORIDE 10 MG/ML
INJECTION, SOLUTION EPIDURAL; INFILTRATION; INTRACAUDAL; PERINEURAL AS NEEDED
Status: DISCONTINUED | OUTPATIENT
Start: 2020-09-18 | End: 2020-09-18 | Stop reason: HOSPADM

## 2020-09-18 RX ORDER — DEXAMETHASONE SODIUM PHOSPHATE 4 MG/ML
INJECTION, SOLUTION INTRA-ARTICULAR; INTRALESIONAL; INTRAMUSCULAR; INTRAVENOUS; SOFT TISSUE AS NEEDED
Status: DISCONTINUED | OUTPATIENT
Start: 2020-09-18 | End: 2020-09-18 | Stop reason: HOSPADM

## 2020-09-18 RX ORDER — KETOROLAC TROMETHAMINE 30 MG/ML
INJECTION, SOLUTION INTRAMUSCULAR; INTRAVENOUS
Status: COMPLETED
Start: 2020-09-18 | End: 2020-09-18

## 2020-09-18 RX ORDER — PROPOFOL 10 MG/ML
INJECTION, EMULSION INTRAVENOUS AS NEEDED
Status: DISCONTINUED | OUTPATIENT
Start: 2020-09-18 | End: 2020-09-18 | Stop reason: HOSPADM

## 2020-09-18 RX ORDER — SODIUM CHLORIDE, SODIUM LACTATE, POTASSIUM CHLORIDE, CALCIUM CHLORIDE 600; 310; 30; 20 MG/100ML; MG/100ML; MG/100ML; MG/100ML
25 INJECTION, SOLUTION INTRAVENOUS CONTINUOUS
Status: DISCONTINUED | OUTPATIENT
Start: 2020-09-18 | End: 2020-09-18 | Stop reason: HOSPADM

## 2020-09-18 RX ORDER — FENTANYL CITRATE 50 UG/ML
INJECTION, SOLUTION INTRAMUSCULAR; INTRAVENOUS AS NEEDED
Status: DISCONTINUED | OUTPATIENT
Start: 2020-09-18 | End: 2020-09-18 | Stop reason: HOSPADM

## 2020-09-18 RX ORDER — IBUPROFEN 600 MG/1
TABLET ORAL
Qty: 30 TAB | Refills: 0 | Status: SHIPPED | OUTPATIENT
Start: 2020-09-18 | End: 2020-09-28

## 2020-09-18 RX ORDER — PROPOFOL 10 MG/ML
INJECTION, EMULSION INTRAVENOUS
Status: DISCONTINUED | OUTPATIENT
Start: 2020-09-18 | End: 2020-09-18 | Stop reason: HOSPADM

## 2020-09-18 RX ORDER — METRONIDAZOLE 500 MG/1
500 TABLET ORAL 2 TIMES DAILY
Qty: 14 TAB | Refills: 0 | Status: SHIPPED | OUTPATIENT
Start: 2020-09-18 | End: 2021-09-11

## 2020-09-18 RX ORDER — KETOROLAC TROMETHAMINE 30 MG/ML
30 INJECTION, SOLUTION INTRAMUSCULAR; INTRAVENOUS ONCE
Status: COMPLETED | OUTPATIENT
Start: 2020-09-18 | End: 2020-09-18

## 2020-09-18 RX ORDER — LIDOCAINE HYDROCHLORIDE 20 MG/ML
INJECTION, SOLUTION EPIDURAL; INFILTRATION; INTRACAUDAL; PERINEURAL AS NEEDED
Status: DISCONTINUED | OUTPATIENT
Start: 2020-09-18 | End: 2020-09-18 | Stop reason: HOSPADM

## 2020-09-18 RX ORDER — ONDANSETRON 2 MG/ML
INJECTION INTRAMUSCULAR; INTRAVENOUS AS NEEDED
Status: DISCONTINUED | OUTPATIENT
Start: 2020-09-18 | End: 2020-09-18 | Stop reason: HOSPADM

## 2020-09-18 RX ADMIN — PROPOFOL 40 MG: 10 INJECTION, EMULSION INTRAVENOUS at 12:22

## 2020-09-18 RX ADMIN — PROPOFOL 50 MG: 10 INJECTION, EMULSION INTRAVENOUS at 12:25

## 2020-09-18 RX ADMIN — SODIUM CHLORIDE, SODIUM LACTATE, POTASSIUM CHLORIDE, AND CALCIUM CHLORIDE 25 ML/HR: 600; 310; 30; 20 INJECTION, SOLUTION INTRAVENOUS at 11:34

## 2020-09-18 RX ADMIN — Medication 3 AMPULE: at 11:35

## 2020-09-18 RX ADMIN — KETOROLAC TROMETHAMINE 30 MG: 30 INJECTION, SOLUTION INTRAMUSCULAR; INTRAVENOUS at 13:45

## 2020-09-18 RX ADMIN — FENTANYL CITRATE 50 MCG: 50 INJECTION, SOLUTION INTRAMUSCULAR; INTRAVENOUS at 12:21

## 2020-09-18 RX ADMIN — PROPOFOL 50 MG: 10 INJECTION, EMULSION INTRAVENOUS at 12:13

## 2020-09-18 RX ADMIN — PROPOFOL 50 MG: 10 INJECTION, EMULSION INTRAVENOUS at 12:40

## 2020-09-18 RX ADMIN — DEXAMETHASONE SODIUM PHOSPHATE 4 MG: 4 INJECTION, SOLUTION INTRAMUSCULAR; INTRAVENOUS at 12:23

## 2020-09-18 RX ADMIN — PROPOFOL 40 MG: 10 INJECTION, EMULSION INTRAVENOUS at 12:37

## 2020-09-18 RX ADMIN — ONDANSETRON HYDROCHLORIDE 4 MG: 2 INJECTION, SOLUTION INTRAMUSCULAR; INTRAVENOUS at 12:23

## 2020-09-18 RX ADMIN — PROPOFOL 50 MG: 10 INJECTION, EMULSION INTRAVENOUS at 12:27

## 2020-09-18 RX ADMIN — PROPOFOL 50 MG: 10 INJECTION, EMULSION INTRAVENOUS at 12:30

## 2020-09-18 RX ADMIN — PROPOFOL 50 MG: 10 INJECTION, EMULSION INTRAVENOUS at 12:34

## 2020-09-18 RX ADMIN — LIDOCAINE HYDROCHLORIDE 40 MG: 20 INJECTION, SOLUTION EPIDURAL; INFILTRATION; INTRACAUDAL; PERINEURAL at 12:12

## 2020-09-18 RX ADMIN — PROPOFOL 100 MCG/KG/MIN: 10 INJECTION, EMULSION INTRAVENOUS at 12:14

## 2020-09-18 RX ADMIN — FENTANYL CITRATE 50 MCG: 50 INJECTION, SOLUTION INTRAMUSCULAR; INTRAVENOUS at 12:12

## 2020-09-18 NOTE — ANESTHESIA POSTPROCEDURE EVALUATION
Procedure(s):  SUCTION DILATATION AND CURETTAGE (URGENT). MAC, general - backup    Anesthesia Post Evaluation      Multimodal analgesia: multimodal analgesia used between 6 hours prior to anesthesia start to PACU discharge  Patient location during evaluation: bedside  Patient participation: complete - patient participated  Level of consciousness: awake  Pain management: adequate  Airway patency: patent  Anesthetic complications: no  Cardiovascular status: acceptable  Respiratory status: acceptable  Hydration status: acceptable  Comments: +Post-Anesthesia Evaluation and Assessment    Patient: Karlee Theodore MRN: [id number]  SSN: [id number]   YOB: 2000  Age: 21 y.o. Sex: female          Cardiovascular Function/Vital Signs    /64   Pulse 60   Temp 36.7 °C (98 °F)   Resp 16   Ht 5' 3\" (1.6 m)   Wt 64.4 kg (141 lb 15.6 oz)   SpO2 100%   BMI 25.15 kg/m²     Patient is status post Procedure(s):  SUCTION DILATATION AND CURETTAGE (URGENT). Nausea/Vomiting: Controlled. Postoperative hydration reviewed and adequate. Pain:  Pain Scale 1: Numeric (0 - 10) (09/18/20 1415)  Pain Intensity 1: 0 (09/18/20 1415)   Managed. Neurological Status:   Neuro (WDL): Within Defined Limits (09/18/20 1415)   At baseline. Mental Status and Level of Consciousness: Arousable. Pulmonary Status:   O2 Device: Room air (09/18/20 1345)   Adequate oxygenation and airway patent. Complications related to anesthesia: None    Post-anesthesia assessment completed. No concerns. I have evaluated the patient and the patient is stable and ready to be discharged from PACU .     Signed By: Evan Valera MD    9/21/2020  Post anesthesia nausea and vomiting:  controlled  Final Post Anesthesia Temperature Assessment:  Normothermia (36.0-37.5 degrees C)      INITIAL Post-op Vital signs:   Vitals Value Taken Time   /62 9/18/2020  1:30 PM   Temp 36.6 °C (97.8 °F) 9/18/2020 12:55 PM   Pulse 57 9/18/2020 1:39 PM   Resp 17 9/18/2020  1:39 PM   SpO2 100 % 9/18/2020  1:39 PM   Vitals shown include unvalidated device data.

## 2020-09-18 NOTE — H&P
Gynecology History and Physical    Name: Eyad Koch MRN: [id number] SSN: [id number]    YOB: 2000  Age: 21 y.o. Sex: female       Subjective:      Chief complaint:  I am here for my D&C    Enrique Hewitt is a 21 y.o.  female with a history of incomplete . She was dx with a missed AB about 3 weeks ago and initially took Cytotec for medical management. In office US showed retained POC's. Patient opted for surgical management. Patient reporting some vaginal spotting since last office visit. No further passage of tissue. The current method of family planning is none. OB History        2    Para        Term                AB   1    Living           SAB        TAB        Ectopic        Molar        Multiple        Live Births                  Past Medical History:   Diagnosis Date     2020    PO    Bronchitis     10-15 years ago     Past Surgical History:   Procedure Laterality Date    HX HEENT      wisdom teeth extraction 2017    HX WISDOM TEETH EXTRACTION       Social History     Occupational History    Not on file   Tobacco Use    Smoking status: Never Smoker    Smokeless tobacco: Never Used   Substance and Sexual Activity    Alcohol use: No    Drug use: Yes     Types: Marijuana     Comment: 3x week, 20 last use    Sexual activity: Yes     Partners: Male     Birth control/protection: None     Family History   Problem Relation Age of Onset    No Known Problems Mother     No Known Problems Father         No Known Allergies  Prior to Admission medications    Medication Sig Start Date End Date Taking? Authorizing Provider   cephALEXin (Keflex) 500 mg capsule Take 1 Cap by mouth two (2) times a day. 9/15/20   Tahir Sanchez MD        Review of Systems:  A comprehensive review of systems was negative except for that written in the History of Present Illness.      Objective:     Vitals:    20 0951 20 1114   BP:  126/85 Pulse:  74   Resp:  18   Temp:  99.3 °F (37.4 °C)   SpO2:  98%   Weight: 65.8 kg (145 lb) 64.4 kg (141 lb 15.6 oz)   Height: 5' 3\" (1.6 m) 5' 3\" (1.6 m)       Physical Exam:  General: A&O x 3 in NAD  Cardio: S1 S2 present no g/m/r  Pulm: CTABL  Abdom: soft, non-tender, non-distended,+ BS  Extrem: warm, dry, non-tender, negative homans sign, no edema noted       Assessment:   Eyad Koch is a 21 y.o. old   Incomplete ab       s/p Cytotec   Hx of missed Ab at approx 6 weeks gestation   Plan:     Procedure(s) (LRB):  SUCTION DILATATION AND CURETTAGE (URGENT) (N/A)  Discussed the risks of surgery including the risks of bleeding, infection, deep vein thrombosis, and surgical injuries to internal organs including but not limited to the bowels, bladder, rectum, and female reproductive organs. The patient understands the risks; any and all questions were answered to the patient's satisfaction.

## 2020-09-18 NOTE — DISCHARGE INSTRUCTIONS
No intercourse for one week   Follow up in the office on Wednesday or Friday. Please call to make an apt      Date: 20    Patient: Keara Mcneil,  Age: 21 y.o.,  Sex: female, (:2000)   DISCHARGE SUMMARY from Nurse    Patient Education        Dilation and Curettage: What to Expect at Home  Your Recovery  Dilation and curettage (D&C) is a procedure to remove tissue from the inside of the uterus. The doctor used a curved tool, called a curette, to gently scrape tissue from your uterus. You are likely to have a backache, or cramps similar to menstrual cramps, and pass small clots of blood from your vagina for the first few days. You may have light vaginal bleeding for several weeks after the procedure. You will probably be able to go back to most of your normal activities in 1 or 2 days. This care sheet gives you a general idea about how long it will take for you to recover. But each person recovers at a different pace. Follow the steps below to get better as quickly as possible. How can you care for yourself at home? Activity    · Rest when you feel tired. Getting enough sleep will help you recover.     · Most women are able to return to work the day after the procedure.     · You may have some light vaginal bleeding. Use sanitary pads until you stop bleeding. Using pads makes it easier to monitor your bleeding. Do not rinse your vagina with fluid (douche).   · Ask your doctor when it is okay for you to have sex. Diet    · You can eat your normal diet. If your stomach is upset, try bland, low-fat foods like plain rice, broiled chicken, toast, and yogurt.     · Drink plenty of fluids (unless your doctor tells you not to). Medicines    · Your doctor will tell you if and when you can restart your medicines. You will also get instructions about taking any new medicines.     · If you take aspirin or some other blood thinner, ask your doctor if and when to start taking it again.  Make sure that you understand exactly what your doctor wants you to do.     · Be safe with medicines. Take pain medicines exactly as directed. ? If the doctor gave you a prescription medicine for pain, take it as prescribed. ? If you are not taking a prescription pain medicine, ask your doctor if you can take an over-the-counter medicine.     · If you think your pain medicine is making you sick to your stomach:  ? Take your medicine after meals (unless your doctor has told you not to). ? Ask your doctor for a different pain medicine.     · If your doctor prescribed antibiotics, take them as directed. Do not stop taking them just because you feel better. You need to take the full course of antibiotics. Follow-up care is a key part of your treatment and safety. Be sure to make and go to all appointments, and call your doctor if you are having problems. It's also a good idea to know your test results and keep a list of the medicines you take. When should you call for help? Call 911 anytime you think you may need emergency care. For example, call if:    · You passed out (lost consciousness).     · You have chest pain, are short of breath, or cough up blood. Call your doctor now or seek immediate medical care if:    · You have bright red vaginal bleeding that soaks one or more pads in an hour, or you have large clots.     · You have vaginal discharge that increases in amount or smells bad.     · You are sick to your stomach or cannot drink fluids.     · You have pain that does not get better after you take pain medicine.     · You cannot pass stools or gas.     · You have symptoms of a blood clot in your leg (called a deep vein thrombosis), such as:  ? Pain in your calf, back of the knee, thigh, or groin. ? Redness and swelling in your leg.     · You have signs of infection, such as:  ? Increased pain, swelling, warmth, or redness. ? Red streaks leading from the area. ? Pus draining from the area. ? A fever.    Watch closely for changes in your health, and be sure to contact your doctor if you have any problems. Where can you learn more? Go to http://tomi-grabiel.info/  Enter D453 in the search box to learn more about \"Dilation and Curettage: What to Expect at Home. \"  Current as of: February 11, 2020               Content Version: 12.6  © 9052-3245 [facility]. Care instructions adapted under license by [employer] (which disclaims liability or warranty for this information). If you have questions about a medical condition or this instruction, always ask your healthcare professional. [place] [address] any warranty or liability for your use of this information. PATIENT INSTRUCTIONS:    After general anesthesia or intravenous sedation, for 24 hours or while taking prescription Narcotics:  · Limit your activities  · Do not drive and operate hazardous machinery  · Do not make important personal or business decisions  · Do  not drink alcoholic beverages  · If you have not urinated within 8 hours after discharge, please contact your surgeon on call. Report the following to your surgeon:  · Excessive pain, swelling, redness or odor of or around the surgical area  · Temperature over 100.5  · Nausea and vomiting lasting longer than 4 hours or if unable to take medications  · Any signs of decreased circulation or nerve impairment to extremity: change in color, persistent  numbness, tingling, coldness or increase pain  · Any questions      *  Please give a list of your current medications to your Primary Care Provider. *  Please update this list whenever your medications are discontinued, doses are      changed, or new medications (including over-the-counter products) are added. *  Please carry medication information at all times in case of emergency situations.     These are general instructions for a healthy lifestyle:    No smoking/ No tobacco products/ Avoid exposure to second hand smoke  Surgeon General's Warning:  Quitting smoking now greatly reduces serious risk to your health. Obesity, smoking, and sedentary lifestyle greatly increases your risk for illness    A healthy diet, regular physical exercise & weight monitoring are important for maintaining a healthy lifestyle    You may be retaining fluid if you have a history of heart failure or if you experience any of the following symptoms:  Weight gain of 3 pounds or more overnight or 5 pounds in a week, increased swelling in our hands or feet or shortness of breath while lying flat in bed. Please call your doctor as soon as you notice any of these symptoms; do not wait until your next office visit. The discharge information has been reviewed with the patient and Family. The patient and Family verbalized understanding. Discharge medications reviewed with the patient and Family and appropriate educational materials and side effects teaching were provided. ___________________________________________________________________________________________________________________________________    Patient Education      Metronidazole (Flagyl, Flagyl 375, Flagyl ER) - (By mouth)   Why this medicine is used:   Treats bacterial infections. Contact a nurse or doctor right away if you have:  · Confusion, drowsiness, clumsiness, trouble talking  · Seizures  · Fever, headache, loss of appetite, nausea or vomiting  · Dizziness, problems with muscle control, stiff neck or back, shakiness  · Numbness, tingling, or burning pain in your hands, arms, legs, or feet     Common side effects:  · Vaginal itching, vaginal yeast infection (women)  · Nausea, stomach discomfort, unusual or unpleasant taste in your mouth  · Headache, rash  © 2017 [address] is for End User's use only and may not be sold, redistributed or otherwise used for commercial purposes.      Patient Education      Ibuprofen (Advil, Advil Children's, Motrin, Children's Ibuprofen) - (By mouth)   Why this medicine is used:   Treats pain and fever. This medicine is an NSAID. Contact a nurse or doctor right away if you have:  · Change in how much or how often you urinate  · Severe stomach pain, vomiting blood, bloody or black tarry stools  · Swelling in your hands, ankles, or feet; rapid weight gain     Common side effects:  · Constipation, diarrhea, gas, mild upset stomach  · Ringing in your ears, dizziness, headache  © 2017 [facility] [address] is for End User's use only and may not be sold, redistributed or otherwise used for commercial purposes.

## 2020-09-18 NOTE — ANESTHESIA PREPROCEDURE EVALUATION
Relevant Problems   No relevant active problems       Anesthetic History   No history of anesthetic complications            Review of Systems / Medical History  Patient summary reviewed, nursing notes reviewed and pertinent labs reviewed    Pulmonary  Within defined limits                 Neuro/Psych   Within defined limits           Cardiovascular  Within defined limits                Exercise tolerance: >4 METS     GI/Hepatic/Renal  Within defined limits              Endo/Other  Within defined limits           Other Findings   Comments: Drug use: Marijuana            Physical Exam    Airway  Mallampati: II  TM Distance: 4 - 6 cm  Neck ROM: normal range of motion   Mouth opening: Normal     Cardiovascular  Regular rate and rhythm,  S1 and S2 normal,  no murmur, click, rub, or gallop             Dental  No notable dental hx       Pulmonary  Breath sounds clear to auscultation               Abdominal  GI exam deferred       Other Findings            Anesthetic Plan    ASA: 1  Anesthesia type: MAC and general - backup            Anesthetic plan and risks discussed with: Patient

## 2020-09-18 NOTE — PERIOP NOTES
Patient states she was tested 3 weeks ago [facility] for covid 19 and results were negative. Denies exposure or S/S of virus. Warming blanket has been applied.

## 2020-09-18 NOTE — ROUTINE PROCESS
Patient: Marko Galvez MRN: [id number]  SSN: [id number]   YOB: 2000  Age: 21 y.o. Sex: female     Patient is status post Procedure(s):  SUCTION DILATATION AND CURETTAGE (URGENT).     Surgeon(s) and Role:     Barrett Nageotte, MD - Primary    Local/Dose/Irrigation: LIDOCAINE 0.5% PLAIN 20MLS                  Peripheral IV 09/18/20 Right Hand (Active)   Site Assessment Clean, dry, & intact 09/18/20 1132   Phlebitis Assessment 0 09/18/20 1132   Infiltration Assessment 0 09/18/20 1132   Dressing Status Clean, dry, & intact 09/18/20 1132   Dressing Type Transparent;Tape 09/18/20 1132   Hub Color/Line Status Pink 09/18/20 1132   Alcohol Cap Used No 09/18/20 1132                           Dressing/Packing:  Wound Perineum-Dressing Type: Ela-pad (09/18/20 1235)    Splint/Cast:  ]    Other:

## 2020-09-18 NOTE — PERIOP NOTES
Handoff Report from Operating Room to PACU    Report received from JESUS Cox RN and Nicol Parra CRNA regarding Loreto Lugo. Surgeon(s):  Masha Jesus MD  And Procedure(s) (LRB):  SUCTION DILATATION AND CURETTAGE (URGENT) (N/A)  confirmed   with allergies, drains and dressings discussed. Anesthesia type, drugs, patient history, complications, estimated blood loss, vital signs, intake and output, and last pain medication, lines, reversal medications and temperature were reviewed.     Bedside report given to Gloria Wren RN

## 2020-09-18 NOTE — PERIOP NOTES
TRANSFER - OUT REPORT:    Verbal report given to JAMIE Goff RN(name) on [facility]  being transferred to Phase II(unit) for routine progression of care       Report consisted of patients Situation, Background, Assessment and   Recommendations(SBAR). Information from the following report(s) OR Summary, Intake/Output and MAR was reviewed with the receiving nurse. Opportunity for questions and clarification was provided.       Patient transported with:   Registered Nurse

## 2020-09-19 NOTE — BRIEF OP NOTE
Brief Postoperative Note    Patient: Nori Claude  YOB: 2000  MRN: [id number]    Date of Procedure: 9/18/2020     Pre-Op Diagnosis: INCOMPLETE AB    Post-Op Diagnosis: Same as preoperative diagnosis. Procedure(s):  SUCTION DILATATION AND CURETTAGE    Surgeon(s):  Jduith Bolanos MD    Surgical Assistant: None    Anesthesia: MAC     Estimated Blood Loss (mL): 87CL    Complications: None    Specimens:   ID Type Source Tests Collected by Time Destination   1 :  Preservative Products of Conception  Judith Bolanos MD 9/18/2020 1238 Pathology        Implants: None   Drains: None     Findings: 7 week size anteverted uterus with benign adnexa. Uterus sounded to 7cm. Moderate amount of products of conception. Vaginal odor consistent with bacterial vaginosis.      Electronically Signed by Luis F Andrew MD on 9/19/2020 at 10:06 AM

## 2020-09-19 NOTE — OP NOTES
[address]  OPERATIVE REPORT    Name:  Alexis Moody  MR#:  [id number]  :  2000  ACCOUNT #:  [de-identified]  DATE OF SERVICE:  2020    PREOPERATIVE DIAGNOSIS:  Incomplete . POSTOPERATIVE DIAGNOSIS:  Incomplete . PROCEDURE PERFORMED:  Suction dilation and curettage. SURGEON:  Vianney Prabhakar MD    ASSISTANT:  None. ANESTHESIA:  MAC.    COMPLICATIONS:  None. SPECIMENS REMOVED:  Products of conception. IMPLANTS:  None. ESTIMATED BLOOD LOSS:  50 mL. DRAINS:  None. FINDINGS:  A 7-week size anteverted uterus with benign adnexa. Uterus found to be 7 cm. Moderate amount of products of conception. Vaginal odor consistent with bacterial vaginosis. PROCEDURE:  After informed consent was obtained, the patient was taken to the operating room and placed in the dorsal lithotomy position with legs in stirrups. After adequate anesthesia was induced, she was prepped and draped in normal sterile fashion. Her bladder was emptied of minimal urine with Red Reza prior to beginning the procedure. A speculum was placed in the vagina and the cervix was visualized and the anterior portion of the cervix was grasped with an Allis clamp. The cervix was serially dilated to accommodate a 7-mm suction curette. The uterus sounded to 7 mm. The 7-mm suction curette was advanced to the fundus, and activated, and rotated both counterclockwise and clockwise to clear the uterus off products of conception. A moderate amount of products of conception were noted. After the uterus was suctioned multiple times, a gentle curettage was performed. The suction device was then advanced to the fundus one additional time to clear any further products in the uterus. The products of conception were handed off to the [place] [id number], to be sent to pathology. The speculum was removed from the vagina after the Allis clamp was removed.   No bleeding was noted from the cervix or the vagina. All sponge count, needle, and instrument counts were correct at the end of the procedure per OR staff. The patient tolerated the procedure well and returned to Recovery room in stable condition.         Gavin Sampson MD      [id number]  D:  09/19/2020 10:30  T:  09/19/2020 17:01  JOB #:  [id number]

## 2021-02-12 LAB
ANTIBODY SCREEN, EXTERNAL: NORMAL
HBSAG, EXTERNAL: NORMAL
HIV, EXTERNAL: NON REACTIVE
RPR, EXTERNAL: NON REACTIVE
RUBELLA, EXTERNAL: NORMAL

## 2021-03-16 LAB
CHLAMYDIA, EXTERNAL: NORMAL
N. GONORRHEA, EXTERNAL: NORMAL

## 2021-08-27 LAB — GRBS, EXTERNAL: NORMAL

## 2021-09-03 ENCOUNTER — HOSPITAL ENCOUNTER (OUTPATIENT)
Dept: PREADMISSION TESTING | Age: 21
Discharge: HOME | End: 2021-09-03
Payer: COMMERCIAL

## 2021-09-03 PROCEDURE — U0005 INFEC AGEN DETEC AMPLI PROBE: HCPCS

## 2021-09-04 LAB
SARS-COV-2, XPLCVT: NOT DETECTED
SOURCE, COVRS: NORMAL

## 2021-09-07 ENCOUNTER — HOSPITAL ENCOUNTER (INPATIENT)
Age: 21
LOS: 3 days | Discharge: HOME | End: 2021-09-11
Attending: OBSTETRICS & GYNECOLOGY | Admitting: OBSTETRICS & GYNECOLOGY
Payer: COMMERCIAL

## 2021-09-07 LAB
ABO + RH BLD: NORMAL
ALBUMIN SERPL-MCNC: 2.6 G/DL (ref 3.5–5)
ALBUMIN/GLOB SERPL: 0.6 {RATIO} (ref 1.1–2.2)
ALP SERPL-CCNC: 234 U/L (ref 45–117)
ALT SERPL-CCNC: 14 U/L (ref 12–78)
AMPHET UR QL SCN: NEGATIVE
ANION GAP SERPL CALC-SCNC: 9 MMOL/L (ref 5–15)
AST SERPL-CCNC: 18 U/L (ref 15–37)
BARBITURATES UR QL SCN: NEGATIVE
BASOPHILS # BLD: 0 K/UL (ref 0–0.1)
BASOPHILS NFR BLD: 0 % (ref 0–1)
BENZODIAZ UR QL: NEGATIVE
BILIRUB SERPL-MCNC: 0.3 MG/DL (ref 0.2–1)
BLOOD GROUP ANTIBODIES SERPL: NORMAL
BUN SERPL-MCNC: 7 MG/DL (ref 6–20)
BUN/CREAT SERPL: 12 (ref 12–20)
CALCIUM SERPL-MCNC: 8.9 MG/DL (ref 8.5–10.1)
CANNABINOIDS UR QL SCN: NEGATIVE
CHLORIDE SERPL-SCNC: 108 MMOL/L (ref 97–108)
CO2 SERPL-SCNC: 22 MMOL/L (ref 21–32)
COCAINE UR QL SCN: NEGATIVE
CREAT SERPL-MCNC: 0.58 MG/DL (ref 0.55–1.02)
CREAT UR-MCNC: 101 MG/DL
DIFFERENTIAL METHOD BLD: ABNORMAL
DRUG SCRN COMMENT,DRGCM: NORMAL
EOSINOPHIL # BLD: 0 K/UL (ref 0–0.4)
EOSINOPHIL NFR BLD: 0 % (ref 0–7)
ERYTHROCYTE [DISTWIDTH] IN BLOOD BY AUTOMATED COUNT: 15 % (ref 11.5–14.5)
GLOBULIN SER CALC-MCNC: 4.6 G/DL (ref 2–4)
GLUCOSE SERPL-MCNC: 95 MG/DL (ref 65–100)
HCT VFR BLD AUTO: 33.9 % (ref 35–47)
HGB BLD-MCNC: 11 G/DL (ref 11.5–16)
IMM GRANULOCYTES # BLD AUTO: 0 K/UL (ref 0–0.04)
IMM GRANULOCYTES NFR BLD AUTO: 0 % (ref 0–0.5)
LDH SERPL L TO P-CCNC: 220 U/L (ref 81–246)
LYMPHOCYTES # BLD: 2.1 K/UL (ref 0.8–3.5)
LYMPHOCYTES NFR BLD: 15 % (ref 12–49)
MCH RBC QN AUTO: 29 PG (ref 26–34)
MCHC RBC AUTO-ENTMCNC: 32.4 G/DL (ref 30–36.5)
MCV RBC AUTO: 89.4 FL (ref 80–99)
METHADONE UR QL: NEGATIVE
MONOCYTES # BLD: 1 K/UL (ref 0–1)
MONOCYTES NFR BLD: 7 % (ref 5–13)
NEUTS BAND NFR BLD MANUAL: 3 %
NEUTS SEG # BLD: 11.1 K/UL (ref 1.8–8)
NEUTS SEG NFR BLD: 75 % (ref 32–75)
NRBC # BLD: 0 K/UL (ref 0–0.01)
NRBC BLD-RTO: 0 PER 100 WBC
OPIATES UR QL: NEGATIVE
PCP UR QL: NEGATIVE
PLATELET # BLD AUTO: 301 K/UL (ref 150–400)
PMV BLD AUTO: 10.8 FL (ref 8.9–12.9)
POTASSIUM SERPL-SCNC: 3.8 MMOL/L (ref 3.5–5.1)
PROT SERPL-MCNC: 7.2 G/DL (ref 6.4–8.2)
PROT UR-MCNC: 19 MG/DL (ref 0–11.9)
PROT/CREAT UR-RTO: 0.2
RBC # BLD AUTO: 3.79 M/UL (ref 3.8–5.2)
RBC MORPH BLD: ABNORMAL
SODIUM SERPL-SCNC: 139 MMOL/L (ref 136–145)
SPECIMEN EXP DATE BLD: NORMAL
URATE SERPL-MCNC: 4.2 MG/DL (ref 2.6–6)
WBC # BLD AUTO: 14.2 K/UL (ref 3.6–11)

## 2021-09-07 PROCEDURE — 85025 COMPLETE CBC W/AUTO DIFF WBC: CPT

## 2021-09-07 PROCEDURE — 3E0P7VZ INTRODUCTION OF HORMONE INTO FEMALE REPRODUCTIVE, VIA NATURAL OR ARTIFICIAL OPENING: ICD-10-PCS | Performed by: OBSTETRICS & GYNECOLOGY

## 2021-09-07 PROCEDURE — 83615 LACTATE (LD) (LDH) ENZYME: CPT

## 2021-09-07 PROCEDURE — 84550 ASSAY OF BLOOD/URIC ACID: CPT

## 2021-09-07 PROCEDURE — 74011250637 HC RX REV CODE- 250/637: Performed by: OBSTETRICS & GYNECOLOGY

## 2021-09-07 PROCEDURE — 86901 BLOOD TYPING SEROLOGIC RH(D): CPT

## 2021-09-07 PROCEDURE — 59200 INSERT CERVICAL DILATOR: CPT

## 2021-09-07 PROCEDURE — 84156 ASSAY OF PROTEIN URINE: CPT

## 2021-09-07 PROCEDURE — 80307 DRUG TEST PRSMV CHEM ANLYZR: CPT

## 2021-09-07 PROCEDURE — 36415 COLL VENOUS BLD VENIPUNCTURE: CPT

## 2021-09-07 PROCEDURE — 75410000002 HC LABOR FEE PER 1 HR

## 2021-09-07 PROCEDURE — 80053 COMPREHEN METABOLIC PANEL: CPT

## 2021-09-07 RX ORDER — NALOXONE HYDROCHLORIDE 0.4 MG/ML
0.4 INJECTION, SOLUTION INTRAMUSCULAR; INTRAVENOUS; SUBCUTANEOUS AS NEEDED
Status: DISCONTINUED | OUTPATIENT
Start: 2021-09-07 | End: 2021-09-11 | Stop reason: HOSPADM

## 2021-09-07 RX ORDER — SODIUM CHLORIDE 0.9 % (FLUSH) 0.9 %
5-40 SYRINGE (ML) INJECTION EVERY 8 HOURS
Status: DISCONTINUED | OUTPATIENT
Start: 2021-09-07 | End: 2021-09-11 | Stop reason: HOSPADM

## 2021-09-07 RX ORDER — OXYTOCIN/RINGER'S LACTATE 30/500 ML
10 PLASTIC BAG, INJECTION (ML) INTRAVENOUS AS NEEDED
Status: COMPLETED | OUTPATIENT
Start: 2021-09-07 | End: 2021-09-09

## 2021-09-07 RX ORDER — SODIUM CHLORIDE, SODIUM LACTATE, POTASSIUM CHLORIDE, CALCIUM CHLORIDE 600; 310; 30; 20 MG/100ML; MG/100ML; MG/100ML; MG/100ML
125 INJECTION, SOLUTION INTRAVENOUS CONTINUOUS
Status: DISCONTINUED | OUTPATIENT
Start: 2021-09-07 | End: 2021-09-07

## 2021-09-07 RX ORDER — ONDANSETRON 4 MG/1
4 TABLET, ORALLY DISINTEGRATING ORAL
Status: DISCONTINUED | OUTPATIENT
Start: 2021-09-07 | End: 2021-09-11 | Stop reason: HOSPADM

## 2021-09-07 RX ORDER — SODIUM CHLORIDE 0.9 % (FLUSH) 0.9 %
5-40 SYRINGE (ML) INJECTION AS NEEDED
Status: DISCONTINUED | OUTPATIENT
Start: 2021-09-07 | End: 2021-09-11 | Stop reason: HOSPADM

## 2021-09-07 RX ORDER — ZOLPIDEM TARTRATE 5 MG/1
5 TABLET ORAL
Status: DISCONTINUED | OUTPATIENT
Start: 2021-09-07 | End: 2021-09-11 | Stop reason: HOSPADM

## 2021-09-07 RX ORDER — SODIUM CHLORIDE, SODIUM LACTATE, POTASSIUM CHLORIDE, CALCIUM CHLORIDE 600; 310; 30; 20 MG/100ML; MG/100ML; MG/100ML; MG/100ML
125 INJECTION, SOLUTION INTRAVENOUS CONTINUOUS
Status: DISCONTINUED | OUTPATIENT
Start: 2021-09-08 | End: 2021-09-11 | Stop reason: HOSPADM

## 2021-09-07 RX ORDER — ACETAMINOPHEN 325 MG/1
650 TABLET ORAL
Status: DISCONTINUED | OUTPATIENT
Start: 2021-09-07 | End: 2021-09-11 | Stop reason: HOSPADM

## 2021-09-07 RX ORDER — OXYTOCIN/RINGER'S LACTATE 30/500 ML
0-20 PLASTIC BAG, INJECTION (ML) INTRAVENOUS
Status: DISCONTINUED | OUTPATIENT
Start: 2021-09-08 | End: 2021-09-11 | Stop reason: HOSPADM

## 2021-09-07 RX ORDER — MAG HYDROX/ALUMINUM HYD/SIMETH 200-200-20
30 SUSPENSION, ORAL (FINAL DOSE FORM) ORAL
Status: DISCONTINUED | OUTPATIENT
Start: 2021-09-07 | End: 2021-09-11 | Stop reason: HOSPADM

## 2021-09-07 RX ORDER — LANOLIN ALCOHOL/MO/W.PET/CERES
CREAM (GRAM) TOPICAL
COMMUNITY
End: 2021-09-11

## 2021-09-07 RX ORDER — BUTORPHANOL TARTRATE 2 MG/ML
1 INJECTION INTRAMUSCULAR; INTRAVENOUS
Status: DISCONTINUED | OUTPATIENT
Start: 2021-09-07 | End: 2021-09-11 | Stop reason: HOSPADM

## 2021-09-07 RX ORDER — OXYTOCIN/RINGER'S LACTATE 30/500 ML
87.3 PLASTIC BAG, INJECTION (ML) INTRAVENOUS AS NEEDED
Status: DISCONTINUED | OUTPATIENT
Start: 2021-09-07 | End: 2021-09-11 | Stop reason: HOSPADM

## 2021-09-07 RX ADMIN — DINOPROSTONE 10 MG: 10 INSERT VAGINAL at 13:21

## 2021-09-07 NOTE — H&P
History & Physical    Name: Christine Burdick MRN: [id number]  SSN: [id number]    YOB: 2000  Age: 24 y.o. Sex: female      Subjective:     Estimated Date of Delivery: None noted. OB History    Para Term  AB Living   2       1     SAB TAB Ectopic Molar Multiple Live Births                    # Outcome Date GA Lbr Alex/2nd Weight Sex Delivery Anes PTL Lv   2 Current            1 AB                Ms. Andre Caceres is admitted with pregnancy at Unknown for induction of labor due to elevated BP. Prenatal course was normal.  Please see prenatal records for details. Past Medical History:   Diagnosis Date     2020    PO    Bronchitis     10-15 years ago     Past Surgical History:   Procedure Laterality Date    HX HEENT      wisdom teeth extraction 2017    HX WISDOM TEETH EXTRACTION       Social History     Occupational History    Not on file   Tobacco Use    Smoking status: Never Smoker    Smokeless tobacco: Never Used   Substance and Sexual Activity    Alcohol use: No    Drug use: Yes     Types: Marijuana     Comment: 3x week, 20 last use    Sexual activity: Yes     Partners: Male     Birth control/protection: None     Family History   Problem Relation Age of Onset    No Known Problems Mother     No Known Problems Father        No Known Allergies  Prior to Admission medications    Medication Sig Start Date End Date Taking? Authorizing Provider   metroNIDAZOLE (FlagyL) 500 mg tablet Take 1 Tab by mouth two (2) times a day. 20   Hernesto Malagon MD   cephALEXin (Keflex) 500 mg capsule Take 1 Cap by mouth two (2) times a day. 9/15/20   Grier Osgood, MD        Review of Systems: A comprehensive review of systems was negative except for that written in the History of Present Illness. Objective:     Vitals: There were no vitals filed for this visit. Physical Exam:  Patient without distress.   Membranes:  Intact  Abdomen: soft non-tender   Cervix: closed/thick Prenatal Labs:   Lab Results   Component Value Date/Time    ABO/Rh(D) O POSITIVE 2020 11:31 AM       Impression/Plan:   Bess Lane is a 24 y.o.  at 38w5d  HTN      GHTN vs Preeclampsia   O POSITIVE     Plan:   Admit for induction of labor with cervidil   Obtain preeclampsia labs    Stacey L. Nicolina Sandhoff, [address]   [phone]

## 2021-09-07 NOTE — ROUTINE PROCESS
18- Dr. Nicolina Sandhoff at bedside. 1321- Cervidil placed by Dr. Nicolina Sandhoff at this time. 1920- Bedside and Verbal shift change report given to Radha Littlejohn RN (oncoming nurse) by Jeni Agarwal. Kristen Briscoe (offgoing nurse). Report included the following information SBAR, Kardex, Procedure Summary, Intake/Output, MAR and Recent Results.

## 2021-09-07 NOTE — PROGRESS NOTES
Pt here from office for induction for gestational hypertension per Dr. Vijaya Goodwin.  38w5d. Denies headache, vision changes or epigastric pain. Reports +FM, denies LOF or vaginal bleeding.

## 2021-09-08 PROBLEM — Z34.90 ENCOUNTER FOR INDUCTION OF LABOR: Status: ACTIVE | Noted: 2021-09-08

## 2021-09-08 PROCEDURE — 65410000002 HC RM PRIVATE OB

## 2021-09-08 PROCEDURE — 75410000002 HC LABOR FEE PER 1 HR

## 2021-09-08 PROCEDURE — 74011250637 HC RX REV CODE- 250/637: Performed by: OBSTETRICS & GYNECOLOGY

## 2021-09-08 PROCEDURE — 3E0DXGC INTRODUCTION OF OTHER THERAPEUTIC SUBSTANCE INTO MOUTH AND PHARYNX, EXTERNAL APPROACH: ICD-10-PCS | Performed by: OBSTETRICS & GYNECOLOGY

## 2021-09-08 PROCEDURE — 74011250637 HC RX REV CODE- 250/637: Performed by: SPECIALIST

## 2021-09-08 RX ADMIN — ZOLPIDEM TARTRATE 5 MG: 5 TABLET ORAL at 21:48

## 2021-09-08 RX ADMIN — MISOPROSTOL 25 MCG: 100 TABLET ORAL at 08:00

## 2021-09-08 RX ADMIN — DINOPROSTONE 10 MG: 10 INSERT VAGINAL at 17:03

## 2021-09-08 RX ADMIN — MISOPROSTOL 25 MCG: 100 TABLET ORAL at 12:24

## 2021-09-08 RX ADMIN — MISOPROSTOL 25 MCG: 100 TABLET ORAL at 02:52

## 2021-09-08 NOTE — PROGRESS NOTES
5. Assumed care of patient  1930: Shift assessment complete. No c/o at this time. See flowsheet for ongoing assessment.

## 2021-09-08 NOTE — ROUTINE PROCESS
0715- Bedside and Verbal shift change report given to MICHELLE Stinson (oncoming nurse) by STONE Acosta RN (offgoing nurse). Report included the following information SBAR, Kardex, Procedure Summary, Intake/Output, MAR and Recent Results     4066- Dr. Willa Shell at bedside. SVE done. Cervix still 0. POC to continue with cytotec.     7321- Patient off EFM for 30 min per cytotec protocol    1930- Bedside and Verbal shift change report given to STONE Alfaro RN (oncoming nurse) by Rizwana Stinson (offgoing nurse). Report included the following information SBAR, Kardex, Procedure Summary, Intake/Output, MAR and Recent Results.

## 2021-09-09 ENCOUNTER — ANESTHESIA EVENT (OUTPATIENT)
Dept: LABOR AND DELIVERY | Age: 21
End: 2021-09-09
Payer: COMMERCIAL

## 2021-09-09 ENCOUNTER — ANESTHESIA (OUTPATIENT)
Dept: LABOR AND DELIVERY | Age: 21
End: 2021-09-09
Payer: COMMERCIAL

## 2021-09-09 PROCEDURE — 3E0R3BZ INTRODUCTION OF ANESTHETIC AGENT INTO SPINAL CANAL, PERCUTANEOUS APPROACH: ICD-10-PCS | Performed by: STUDENT IN AN ORGANIZED HEALTH CARE EDUCATION/TRAINING PROGRAM

## 2021-09-09 PROCEDURE — 00HU33Z INSERTION OF INFUSION DEVICE INTO SPINAL CANAL, PERCUTANEOUS APPROACH: ICD-10-PCS | Performed by: STUDENT IN AN ORGANIZED HEALTH CARE EDUCATION/TRAINING PROGRAM

## 2021-09-09 PROCEDURE — 65410000002 HC RM PRIVATE OB

## 2021-09-09 PROCEDURE — 74011000250 HC RX REV CODE- 250

## 2021-09-09 PROCEDURE — 74011250636 HC RX REV CODE- 250/636: Performed by: OBSTETRICS & GYNECOLOGY

## 2021-09-09 PROCEDURE — 0HQ9XZZ REPAIR PERINEUM SKIN, EXTERNAL APPROACH: ICD-10-PCS | Performed by: OBSTETRICS & GYNECOLOGY

## 2021-09-09 PROCEDURE — 75410000002 HC LABOR FEE PER 1 HR

## 2021-09-09 PROCEDURE — 77030014125 HC TY EPDRL BBMI -B: Performed by: STUDENT IN AN ORGANIZED HEALTH CARE EDUCATION/TRAINING PROGRAM

## 2021-09-09 PROCEDURE — 75410000003 HC RECOV DEL/VAG/CSECN EA 0.5 HR

## 2021-09-09 PROCEDURE — 76060000078 HC EPIDURAL ANESTHESIA

## 2021-09-09 PROCEDURE — 74011250636 HC RX REV CODE- 250/636

## 2021-09-09 PROCEDURE — 3E033VJ INTRODUCTION OF OTHER HORMONE INTO PERIPHERAL VEIN, PERCUTANEOUS APPROACH: ICD-10-PCS | Performed by: OBSTETRICS & GYNECOLOGY

## 2021-09-09 PROCEDURE — 75410000000 HC DELIVERY VAGINAL/SINGLE

## 2021-09-09 RX ORDER — OXYTOCIN 10 [USP'U]/ML
INJECTION, SOLUTION INTRAMUSCULAR; INTRAVENOUS
Status: COMPLETED
Start: 2021-09-09 | End: 2021-09-09

## 2021-09-09 RX ORDER — ZOLPIDEM TARTRATE 5 MG/1
5 TABLET ORAL
Status: DISCONTINUED | OUTPATIENT
Start: 2021-09-09 | End: 2021-09-10 | Stop reason: SDUPTHER

## 2021-09-09 RX ORDER — OXYTOCIN/RINGER'S LACTATE 30/500 ML
10 PLASTIC BAG, INJECTION (ML) INTRAVENOUS AS NEEDED
Status: DISCONTINUED | OUTPATIENT
Start: 2021-09-09 | End: 2021-09-11 | Stop reason: HOSPADM

## 2021-09-09 RX ORDER — OXYTOCIN/RINGER'S LACTATE 30/500 ML
87.3 PLASTIC BAG, INJECTION (ML) INTRAVENOUS AS NEEDED
Status: DISCONTINUED | OUTPATIENT
Start: 2021-09-09 | End: 2021-09-11 | Stop reason: HOSPADM

## 2021-09-09 RX ORDER — FENTANYL/BUPIVACAINE/NS/PF 2-1250MCG
PREFILLED PUMP RESERVOIR EPIDURAL
Status: COMPLETED
Start: 2021-09-09 | End: 2021-09-09

## 2021-09-09 RX ORDER — ADHESIVE BANDAGE
30 BANDAGE TOPICAL DAILY PRN
Status: DISCONTINUED | OUTPATIENT
Start: 2021-09-09 | End: 2021-09-11 | Stop reason: HOSPADM

## 2021-09-09 RX ORDER — FENTANYL/BUPIVACAINE/NS/PF 2-1250MCG
1-16 PREFILLED PUMP RESERVOIR EPIDURAL CONTINUOUS
Status: DISCONTINUED | OUTPATIENT
Start: 2021-09-09 | End: 2021-09-11 | Stop reason: HOSPADM

## 2021-09-09 RX ORDER — NALOXONE HYDROCHLORIDE 0.4 MG/ML
0.4 INJECTION, SOLUTION INTRAMUSCULAR; INTRAVENOUS; SUBCUTANEOUS AS NEEDED
Status: DISCONTINUED | OUTPATIENT
Start: 2021-09-09 | End: 2021-09-11 | Stop reason: HOSPADM

## 2021-09-09 RX ORDER — SIMETHICONE 80 MG
80 TABLET,CHEWABLE ORAL
Status: DISCONTINUED | OUTPATIENT
Start: 2021-09-09 | End: 2021-09-11 | Stop reason: HOSPADM

## 2021-09-09 RX ORDER — OXYCODONE AND ACETAMINOPHEN 5; 325 MG/1; MG/1
2 TABLET ORAL
Status: DISCONTINUED | OUTPATIENT
Start: 2021-09-09 | End: 2021-09-11 | Stop reason: HOSPADM

## 2021-09-09 RX ORDER — IBUPROFEN 400 MG/1
800 TABLET ORAL EVERY 8 HOURS
Status: DISCONTINUED | OUTPATIENT
Start: 2021-09-09 | End: 2021-09-11 | Stop reason: HOSPADM

## 2021-09-09 RX ORDER — ACETAMINOPHEN 325 MG/1
650 TABLET ORAL
Status: DISCONTINUED | OUTPATIENT
Start: 2021-09-09 | End: 2021-09-11 | Stop reason: HOSPADM

## 2021-09-09 RX ORDER — FENTANYL CITRATE 50 UG/ML
INJECTION, SOLUTION INTRAMUSCULAR; INTRAVENOUS
Status: DISCONTINUED
Start: 2021-09-09 | End: 2021-09-09 | Stop reason: WASHOUT

## 2021-09-09 RX ORDER — ONDANSETRON 4 MG/1
4 TABLET, ORALLY DISINTEGRATING ORAL
Status: ACTIVE | OUTPATIENT
Start: 2021-09-09 | End: 2021-09-10

## 2021-09-09 RX ORDER — HYDROCORTISONE ACETATE PRAMOXINE HCL 2.5; 1 G/100G; G/100G
CREAM TOPICAL AS NEEDED
Status: DISCONTINUED | OUTPATIENT
Start: 2021-09-09 | End: 2021-09-11 | Stop reason: HOSPADM

## 2021-09-09 RX ORDER — DIPHENHYDRAMINE HCL 25 MG
25 CAPSULE ORAL
Status: DISCONTINUED | OUTPATIENT
Start: 2021-09-09 | End: 2021-09-11 | Stop reason: HOSPADM

## 2021-09-09 RX ADMIN — Medication 12 ML/HR: at 17:42

## 2021-09-09 RX ADMIN — SODIUM CHLORIDE, POTASSIUM CHLORIDE, SODIUM LACTATE AND CALCIUM CHLORIDE 125 ML/HR: 600; 310; 30; 20 INJECTION, SOLUTION INTRAVENOUS at 19:08

## 2021-09-09 RX ADMIN — SODIUM CHLORIDE, POTASSIUM CHLORIDE, SODIUM LACTATE AND CALCIUM CHLORIDE 125 ML/HR: 600; 310; 30; 20 INJECTION, SOLUTION INTRAVENOUS at 08:09

## 2021-09-09 RX ADMIN — Medication 10 ML: at 22:12

## 2021-09-09 RX ADMIN — SODIUM CHLORIDE, POTASSIUM CHLORIDE, SODIUM LACTATE AND CALCIUM CHLORIDE 125 ML/HR: 600; 310; 30; 20 INJECTION, SOLUTION INTRAVENOUS at 14:59

## 2021-09-09 RX ADMIN — OXYTOCIN 10000 MILLI-UNITS: 10 INJECTION INTRAVENOUS at 22:08

## 2021-09-09 RX ADMIN — SODIUM CHLORIDE, POTASSIUM CHLORIDE, SODIUM LACTATE AND CALCIUM CHLORIDE 500 ML: 600; 310; 30; 20 INJECTION, SOLUTION INTRAVENOUS at 19:50

## 2021-09-09 RX ADMIN — SODIUM CHLORIDE, POTASSIUM CHLORIDE, SODIUM LACTATE AND CALCIUM CHLORIDE 999 ML/HR: 600; 310; 30; 20 INJECTION, SOLUTION INTRAVENOUS at 13:48

## 2021-09-09 RX ADMIN — OXYTOCIN 1 MILLI-UNITS/MIN: 10 INJECTION INTRAVENOUS at 08:09

## 2021-09-09 RX ADMIN — OXYTOCIN 20 UNITS: 10 INJECTION INTRAVENOUS at 22:14

## 2021-09-09 NOTE — ANESTHESIA PROCEDURE NOTES
CSE Block    Start time: 9/9/2021 5:07 PM  End time: 9/9/2021 5:18 PM  Performed by: Kirit Mcfarland MD  Authorized by: Kirit Mcfarland MD     Pre-Procedure  Indications: primary anesthetic    preanesthetic checklist: patient identified, risks and benefits discussed, anesthesia consent, site marked, patient being monitored and timeout performed    Timeout Time: 17:05 EDT        Procedure:   Patient Position:  Seated  Prep Region:  Lumbar  Prep: DuraPrep    Location:  L2-3    Epidural Needle:   Needle Type:  Tuohy  Needle Gauge:  17 G  Injection Technique:  Loss of resistance using air  Attempts:  2    Spinal Needle:   Needle Type:   Taran  Needle Gauge:  25 G    Catheter:   Catheter Type:  Flex-tip  Catheter Size:  19 G  Catheter at Skin Depth (cm):  11.5  Depth in Epidural Space (cm):  7  Events: no blood with aspiration, no cerebrospinal fluid with aspiration, no paresthesia, negative aspiration test and CSF confirmed    Test Dose:  Negative    Assessment:   Catheter Secured:  Tegaderm and tape  Insertion:  Uncomplicated  Patient tolerance:  Patient tolerated the procedure well with no immediate complications

## 2021-09-09 NOTE — ROUTINE PROCESS
56- Dr. Catherine Mena at bedside. Strip reviewed. POC discussed to try to insert rodriguez balloon and start low dose pitocin    0730- SVE done by Dr. Catherine Mena. Cervix fingertip. rodriguez balloon insertion attempted, unable to be placed. Verbal orders received by Dr. Catherine Mena to start pitocin. 2953- Bedside and verbal shift change report given to MICHELLE Velazquez (oncoming nurse) by STONE Plascencia (offgoing nurse). Report included the following information SBAR, Kardex, Procedure Summary, Intake/Output, MAR, Accordion and Recent Results. 1252- Patient SROM. Clear, moderate fluid noted    1255- Dr. Go Taylor at bedside. SVE done. Cervix 2.    1458- Anesthesia called for epidural procedure    1503- Dr. Atilio Major at bedside. 1504- Patient sitting up on edge of bed for epidural procedure. EFM not tracing at this time. 6018- Time out performed    1513- Dr. Atilio Major left room for emergency    [id number]- Dr. Atilio Major back at bedside. 1- Dr. Atilio Major left room for emergency    Patient requesting to stay up on edge of bed. 1534- Unable to trace FHR consistently. Patient laying back down. EFM tracing at this time. 1915- Bedside and Verbal shift change report given to LUIS CARLOS Monte RN (oncoming nurse) by Merary Velazquez (offgoing nurse). Report included the following information SBAR, Kardex, Procedure Summary, Intake/Output, MAR and Recent Results.

## 2021-09-09 NOTE — PROGRESS NOTES
Jordi Bedolla is comfortable. She denies complaints. Discussed POC given third day of induction with minimal cervical change. Pt declines CS.      Visit Vitals  BP (!) 96/50   Pulse 83   Temp 98.4 °F (36.9 °C)   Resp 16   Ht 5' 3\" (1.6 m)   Wt 90.7 kg (200 lb)   SpO2 99%   BMI 35.43 kg/m²     Cervix: FT/80/-3, three unsuccessful attempts at [facility] placement   Cervix is extremely posterior   FHT: Cat [address]: rare     A:  IOL for GHTN  GBS neg      P:  Pitocin

## 2021-09-09 NOTE — ANESTHESIA PREPROCEDURE EVALUATION
Relevant Problems   No relevant active problems       Anesthetic History   No history of anesthetic complications            Review of Systems / Medical History  Patient summary reviewed, nursing notes reviewed and pertinent labs reviewed    Pulmonary              Pertinent negatives: No COPD and asthma  Comments: Hx of bronchitis   Neuro/Psych   Within defined limits           Cardiovascular    Hypertension (gestational HTN)            Pertinent negatives: No past MI and CHF  Exercise tolerance: >4 METS  Comments: EKG (10/2019):   Sinus rhythm with occasional premature ventricular complexes   Otherwise normal ECG    GI/Hepatic/Renal     GERD           Endo/Other        Obesity     Other Findings   Comments: Drug use: Marijuana            Physical Exam    Airway  Mallampati: II  TM Distance: 4 - 6 cm  Neck ROM: normal range of motion   Mouth opening: Normal     Cardiovascular  Regular rate and rhythm,  S1 and S2 normal,  no murmur, click, rub, or gallop             Dental  No notable dental hx       Pulmonary  Breath sounds clear to auscultation               Abdominal  GI exam deferred       Other Findings            Anesthetic Plan    ASA: 2  Anesthesia type: CSE      Post-op pain plan if not by surgeon: indwelling epidural catheter and epidural opioid      Anesthetic plan and risks discussed with: Patient

## 2021-09-09 NOTE — PROGRESS NOTES
. Bedside shift change report given to LUIS CARLOS Weiner RN (oncoming nurse) by [facility]. Shanel Alvarez, FERMIN (offgoing nurse). Report included the following information SBAR, Kardex, Procedure Summary, Intake/Output, MAR and Recent Results. . Dr. Silvia Stauffer at the bedside to assess patient and introduce himself.    46. Fluid bolus started and position changes. . Dr. Silvia Stauffer at the bedside to assess patients cervix. Cervix. /+1. Will continue with the plan of care.     . Dr. Silvia Stauffer at the bedside to assess patients cervix. Cervix /1.    . Dr. Silvia Stauffer at the bed to assess patients cervix for an increase in pressure. . Patient begins pushing in stirups. . Kiwi vacuum applied to baby head.    3.  of live BG by Dr. Silvia Stauffer.     . Placenta delivered. . Postpartum recovery started. 5. Bedside shift change report given to NAYELI Davies RN (oncoming nurse) by Kelvin Moraes. Micheal Weiner RN (offgoing nurse). Report included the following information SBAR, Kardex, Procedure Summary, Intake/Output, MAR and Recent Results.

## 2021-09-09 NOTE — PROGRESS NOTES
Patient comfortable with her epidural     Visit Vitals  /77   Pulse (!) 109   Temp 98.1 °F (36.7 °C)   Resp 18   Ht 5' 3\" (1.6 m)   Wt 90.7 kg (200 lb)   SpO2 91%   BMI 35.43 kg/m²     , moderate variability, accelerations present, rare variable decelerations   [place]: q 3-4  Cervix: 4/90/-3, molding noted     A:  IOL for GHTN     On pitocin at 14 mus     S/p AROM of clear fluid         Patient and fetal status stable will continue with IOL      Graciela Stubbs, [address]

## 2021-09-09 NOTE — PROGRESS NOTES
1930 - verbal bedside SBAR from [address]. Assumed care of pt at this time. 1945 - call from Dr Lavon Quintana - wants pt checked at 0500 and let her know SVE in order to make a POC.    0420 - pt up to BR, SVE 1/60/-3  Cervidil removed. Will update Dr Lavon Quintana. 5 - Dr Lavon Quintana  Notified of SVE - plan to try and place a cook catheter to continue induction. 7619 - verbal bedside SBAR to Maryanne Monte. Turned over care of pt at this time.

## 2021-09-09 NOTE — PROGRESS NOTES
1657 Taking over patient care at this time. 1658 Epidural time out completed at this time. [address] completed at this time by Dr. Bart Yun. Patient laid supine per MD order. 1 Dr. Amalia Oakley at bedside; SVE at this time 4/90/-3.     1759 Per Dr. Amalia Oakley, call Dr. Miguel Harley for management overnight, call Dr. Amalia Oakley for delivery. 1915 Report given to LUIS CARLOS Moody RN to assume care of patient at this time.

## 2021-09-10 PROCEDURE — 74011250637 HC RX REV CODE- 250/637: Performed by: OBSTETRICS & GYNECOLOGY

## 2021-09-10 PROCEDURE — 65410000002 HC RM PRIVATE OB

## 2021-09-10 RX ADMIN — IBUPROFEN 800 MG: 400 TABLET, FILM COATED ORAL at 10:32

## 2021-09-10 RX ADMIN — IBUPROFEN 800 MG: 400 TABLET, FILM COATED ORAL at 18:29

## 2021-09-10 NOTE — PROGRESS NOTES
Postpartum Rounding Note    Mrayan Carlson 24 y.o.      PPD # 1 s/p VAVD at 39w0d     Subjective:   Patient doing well. Has no complaints. Pain is well controlled. Patient is urinating adequately, ambulating, and on a regular diet. Lochia appropriate. No nausea, vomiting, fever, chills, CP, SOB, calf pain. Objective:   VS:   Visit Vitals  /65 (BP 1 Location: Right upper arm, BP Patient Position: At rest;Sitting)   Pulse 90   Temp 98.9 °F (37.2 °C)   Resp 17   Ht 5' 3\" (1.6 m)   Wt 90.7 kg (200 lb)   SpO2 100%   Breastfeeding Unknown   BMI 35.43 kg/m²      Gen: NAD  Abd: soft, appropriately TTP, uterine fundus firm, below the umbilicus  Ext: nontender, no edema noted    Labs:   WBC   Date/Time Value Ref Range Status   2021 12:45 PM 14.2 (H) 3.6 - 11.0 K/uL Final   2020 05:38 PM 6.4 3.6 - 11.0 K/uL Final     HGB   Date/Time Value Ref Range Status   2021 12:45 PM 11.0 (L) 11.5 - 16.0 g/dL Final   2020 05:38 PM 13.3 11.5 - 16.0 g/dL Final     HCT   Date/Time Value Ref Range Status   2021 12:45 PM 33.9 (L) 35.0 - 47.0 % Final   2020 05:38 PM 38.6 35.0 - 47.0 % Final     PLATELET   Date/Time Value Ref Range Status   2021 12:45  150 - 400 K/uL Final   2020 05:38  150 - 400 K/uL Final        Intake/Output Summary (Last 24 hours) at 9/10/2021 0729  Last data filed at 9/10/2021 0500  Gross per 24 hour   Intake 2859.98 ml   Output 1834 ml   Net 1025.98 ml       Assessment:   Maryan Carlson 24 y.o.    PPD # 1 sp VAVD   Afebrile, VSS, tolerating pain with medication, regular diet, adequate urine output     Plan:   Continue with current management   Discharge home tomorrow if no complications         Graciela Fernández, [address]   [phone]

## 2021-09-10 NOTE — ANESTHESIA POSTPROCEDURE EVALUATION
* No procedures listed *. CSE    Anesthesia Post Evaluation      Multimodal analgesia: multimodal analgesia used between 6 hours prior to anesthesia start to PACU discharge  Patient location during evaluation: bedside  Patient participation: complete - patient participated  Level of consciousness: awake  Pain management: adequate  Airway patency: patent  Anesthetic complications: no  Cardiovascular status: acceptable  Respiratory status: acceptable  Hydration status: acceptable  Post anesthesia nausea and vomiting:  controlled  Final Post Anesthesia Temperature Assessment:  Normothermia (36.0-37.5 degrees C)      INITIAL Post-op Vital signs:   Vitals Value Taken Time   BP     Temp     Pulse     Resp     SpO2 91 % 09/09/21 8495   Vitals shown include unvalidated device data.

## 2021-09-10 NOTE — PROGRESS NOTES
TRANSFER - IN REPORT:    Verbal report received from RENE Hinton RN(name) on Lyndy Monday  being received from L&D(unit) for routine progression of care      Report consisted of patients Situation, Background, Assessment and   Recommendations(SBAR). Information from the following report(s) SBAR, Kardex, Procedure Summary, Intake/Output and MAR was reviewed with the receiving nurse. Opportunity for questions and clarification was provided. Assessment completed upon patients arrival to unit and care assumed.

## 2021-09-10 NOTE — L&D DELIVERY NOTE
Delivery Summary  Patient: Grace Pardo             Additional Delivery Comments - Patient was admitted the 2 days prior to delivery for IOL   She was managed to cervidil, cytotec, cervidil, and pitocin   SROM occurred with clear fluid. Epidural analgesia was placed. Fetal heart tones were Cat 1 and Cat 2..  The patient then progressed through the first stage, then progressed through the second stage to a vaginal delivery. As the vertex approached the perineum with pushing efforts, significant deep variable decelerations were noted. A KIWI was applied without difficulty, and with one contraction and one pull the baby was delivered without difficulty over intact perineum. A tight nuchal cord was reduced on the perineum. She was bulb suctioned, became active and crying, and was placed on the maternal abdomen. The cord was clamped and cut, and then the placenta delivered spontaneously, intact. The fundus became firm, the cervix and sulci were inspected and appeared to be intact. A small first degree perineal laceration was bleeding repaired with 4-0 Vicryl suture. Vaginal exam revealed no evidence of hematoma formation or foreign bodies. Sponge and sharps count was correct. The procedure was tolerated adequately by the patient and she is recovering in stable condition. Information for the patient's :  Sabi Barker [[id number]]     Delivery Type: Vaginal, Vacuum (Extractor)   Delivery Date: 2021   Delivery Time: 10:03 PM     Birth Weight: 2.66 kg     Sex:  female  Delivery Clinician:  Steven Nguyen   Gestational Age: 36w0d    Presentation: Vertex   Position: Middle  Occiput  Anterior     Apgars were 8  and 9      Resuscitation Method: Suctioning-bulb; Tactile Stimulation     Meconium Stained: None    Living Status: Living       Placenta Date/Time: 2021 10:08 PM   Placenta Removal: Spontaneous   Placenta Appearance: Intact    Cord Information: 3 Vessels    Cord Events: Nuchal Cord With Compressions       Disposition of Cord Blood: Lab    Blood Gases Sent?:  No     Episiotomy: None   Laceration(s): 1st     Estimated Blood Loss (ml): No data found    Labor Events  Method: None      Augmentation: Oxytocin   Cervical Ripenin2021  1:21 PM  Cervidil

## 2021-09-10 NOTE — PROGRESS NOTES
Labor Progress Note  Patient seen, fetal heart rate and contraction pattern evaluated. Patient reports she is comfortable with epidural in place. Physical Exam:  Visit Vitals  /73   Pulse 90   Temp 97.6 °F (36.4 °C)   Resp 16   Ht 5' 3\" (1.6 m)   Wt 90.7 kg (200 lb)   SpO2 100%   BMI 35.43 kg/m²     Cervical Exam: 8/90/+1  Membranes:  intact  Uterine Activity: Frequency: 2-5 minutes  Fetal Heart Rate: 120,  adequate variability and reactivity  Accelerations: yes  Decelerations: occasional variable  Pitocin at 14 miu/min    Assessment/Plan:  Reassuring fetal status. Progressing adequately    ALLIE Topete MD

## 2021-09-10 NOTE — PROGRESS NOTES
Bedside and Verbal shift change report given to Shayan Min RN (oncoming nurse) by Antwan Baker RN (offgoing nurse). Report included the following information SBAR, Kardex, Procedure Summary, Intake/Output and MAR.

## 2021-09-10 NOTE — ROUTINE PROCESS
Verbal shift change report given to JAMIE Brown (oncoming nurse) by Candido Morse RN (offgoing nurse). Report included the following information SBAR.

## 2021-09-10 NOTE — LACTATION NOTE
This note was copied from a baby's chart. 1923 [facility] rounds. Mother sleeping. 9 hours of life  3 feedings. Terminal mec reported. Due to void. Pt will successfully establish breastfeeding by feeding in response to early feeding cues   or wake every 3h, will obtain deep latch, and will keep log of feedings/output. Taught to BF at hunger cues and or q 2-3 hrs and to offer 10-20 drops of hand expressed colostrum at any non-feeds. LATCH Documentation  Latch: Grasps breast, tongue down, lips flanged, rhythmic sucking  Audible Swallowing: None  Type of Nipple: Everted (after stimulation)  Comfort (Breast/Nipple): Soft/non-tender  Hold (Positioning): No assist from staff, mother able to position/hold infant  LATCH Score: 8    Phone # on board/introduced to FOB  Call prn. 16 hours of age. Mother up and dressed/having lunch. Pictures just completed. 7 baby led feedings. Due to void, Shown where to look on diaper for blue line in the middle to back side. Reviewed breastfeeding basics:  How milk is made and normal  breastfeeding behaviors discussed. Supply and demand,  stomach size, early feeding cues, skin to skin bonding, positioning and baby led latch-on, assymetrical latch with signs of good, deep latch vs shallow, feeding frequency and duration, and log sheet for tracking infant feedings and output. Breastfeeding Booklet and Warm line information given. Discussed typical  weight loss and the importance of infant weight checks with pediatrician 1 day post discharge. Undecided on pediatrician. List provided.

## 2021-09-11 VITALS
DIASTOLIC BLOOD PRESSURE: 59 MMHG | TEMPERATURE: 98.6 F | OXYGEN SATURATION: 98 % | SYSTOLIC BLOOD PRESSURE: 107 MMHG | WEIGHT: 200 LBS | HEIGHT: 63 IN | BODY MASS INDEX: 35.44 KG/M2 | HEART RATE: 78 BPM | RESPIRATION RATE: 18 BRPM

## 2021-09-11 PROCEDURE — 74011250637 HC RX REV CODE- 250/637: Performed by: OBSTETRICS & GYNECOLOGY

## 2021-09-11 RX ORDER — IBUPROFEN 800 MG/1
800 TABLET ORAL EVERY 8 HOURS
Qty: 100 TABLET | Refills: 1 | Status: SHIPPED | OUTPATIENT
Start: 2021-09-11

## 2021-09-11 RX ORDER — ACETAMINOPHEN 325 MG/1
1000 TABLET ORAL
Qty: 100 TABLET | Refills: 1 | Status: SHIPPED | OUTPATIENT
Start: 2021-09-11

## 2021-09-11 RX ADMIN — IBUPROFEN 800 MG: 400 TABLET, FILM COATED ORAL at 09:34

## 2021-09-11 RX ADMIN — IBUPROFEN 800 MG: 400 TABLET, FILM COATED ORAL at 02:35

## 2021-09-11 NOTE — ROUTINE PROCESS
1000 Patient discharge instructions provided. Denies any questions or concerns at this time. Plans to follow up with OB in 1 week as instructed.

## 2021-09-11 NOTE — DISCHARGE INSTRUCTIONS

## 2021-09-11 NOTE — PROGRESS NOTES
Postpartum Rounding Note    Maryan MANN Aldo 24 y.o.      PPD # 2 s/p VAVD at 39w0d     Subjective:   Patient doing well. Has no complaints. Pain is well controlled. Patient is urinating adequately, ambulating, and on a regular diet. Lochia appropriate. No nausea, vomiting, fever, chills, CP, SOB, calf pain. Objective:   VS:   Visit Vitals  BP (!) 104/57 (BP 1 Location: Right arm, BP Patient Position: Sitting)   Pulse 85   Temp 98 °F (36.7 °C)   Resp 18   Ht 5' 3\" (1.6 m)   Wt 90.7 kg (200 lb)   SpO2 98%   Breastfeeding Unknown   BMI 35.43 kg/m²      Gen: NAD  Abd: soft, appropriately TTP, uterine fundus firm, below the umbilicus  Ext: nontender, no edema noted    Labs:   WBC   Date/Time Value Ref Range Status   2021 12:45 PM 14.2 (H) 3.6 - 11.0 K/uL Final   2020 05:38 PM 6.4 3.6 - 11.0 K/uL Final     HGB   Date/Time Value Ref Range Status   2021 12:45 PM 11.0 (L) 11.5 - 16.0 g/dL Final   2020 05:38 PM 13.3 11.5 - 16.0 g/dL Final     HCT   Date/Time Value Ref Range Status   2021 12:45 PM 33.9 (L) 35.0 - 47.0 % Final   2020 05:38 PM 38.6 35.0 - 47.0 % Final     PLATELET   Date/Time Value Ref Range Status   2021 12:45  150 - 400 K/uL Final   2020 05:38  150 - 400 K/uL Final      No intake or output data in the 24 hours ending 21 0819    Assessment:   Maryan Carlson 21 y.o.    PPD # 2 sp VAVD   Afebrile, VSS, tolerating pain with medication, regular diet, adequate urine output     Plan:   Discharge home   One week follow up for BP check         Graciela Bolton, [address]   [phone]

## 2021-09-11 NOTE — DISCHARGE SUMMARY
Obstetrical Discharge Summary     Name: Kendall Arizmendi MRN: [id number]  SSN: [id number]    YOB: 2000  Age: 24 y.o. Sex: female      Allergies: Patient has no known allergies. Admit Date: 2021    Discharge Date: 2021     Admitting Physician: Chayito Zhao MD     Attending Physician:  Russ Stratton MD     * Admission Diagnoses: Encounter for induction of labor [Z34.90]    * Discharge Diagnoses:   Information for the patient's :  Toby Mediate [[id number]]   Delivery of a 2.66 kg female infant via Vaginal, Vacuum (Extractor) on 2021 at 10:03 PM  by Shahid Duckworth. Apgars were 8  and 9 . Additional Diagnoses:   Hospital Problems as of 2021 Date Reviewed: 2020        Codes Class Noted - Resolved POA    Encounter for induction of labor ICD-10-CM: Z34.90  ICD-9-CM: V22.1  2021 - Present Yes             Lab Results   Component Value Date/Time    ABO/Rh(D) O POSITIVE 2021 12:45 PM    Rubella, External immune 2021 12:00 AM    GrBStrep, External neg 2021 12:00 AM    There is no immunization history for the selected administration types on file for this patient. * Procedures: VAVD         * Discharge Condition: good    [facility] Course: Normal hospital course following the delivery. * Disposition: Home    Discharge Medications:   Current Discharge Medication List      START taking these medications    Details   acetaminophen (TYLENOL) 325 mg tablet Take 3 Tablets by mouth every six (6) hours as needed for Pain. Qty: 100 Tablet, Refills: 1  Start date: 2021      ibuprofen (MOTRIN) 800 mg tablet Take 1 Tablet by mouth every eight (8) hours. Qty: 100 Tablet, Refills: 1  Start date: 2021         CONTINUE these medications which have NOT CHANGED    Details   PNV Comb #2-Iron-FA-Omega 3 29-1-400 mg cmpk Take  by mouth.          STOP taking these medications       ferrous sulfate (Iron) 325 mg (65 mg iron) tablet Comments:   Reason for Stopping:         metroNIDAZOLE (FlagyL) 500 mg tablet Comments:   Reason for Stopping:         cephALEXin (Keflex) 500 mg capsule Comments:   Reason for Stopping:               * Follow-up Care/Patient Instructions:   Activity: Activity as tolerated and No sex for 6 weeks  Diet: Regular Diet  Wound Care: As directed    Follow-up Information     Follow up With Specialties Details Why Contact Info    None    None (395) Patient stated that they have no PCP           One week follow up in office

## 2022-02-15 ENCOUNTER — HOSPITAL ENCOUNTER (EMERGENCY)
Age: 22
Discharge: HOME | End: 2022-02-15
Attending: EMERGENCY MEDICINE
Payer: COMMERCIAL

## 2022-02-15 VITALS
HEIGHT: 63 IN | RESPIRATION RATE: 16 BRPM | SYSTOLIC BLOOD PRESSURE: 133 MMHG | OXYGEN SATURATION: 100 % | DIASTOLIC BLOOD PRESSURE: 67 MMHG | HEART RATE: 68 BPM | BODY MASS INDEX: 33.32 KG/M2 | WEIGHT: 188.05 LBS | TEMPERATURE: 98.6 F

## 2022-02-15 DIAGNOSIS — N76.0 BV (BACTERIAL VAGINOSIS): Primary | ICD-10-CM

## 2022-02-15 DIAGNOSIS — B96.89 BV (BACTERIAL VAGINOSIS): Primary | ICD-10-CM

## 2022-02-15 LAB
APPEARANCE UR: CLEAR
BACTERIA URNS QL MICRO: NEGATIVE /HPF
BILIRUB UR QL: NEGATIVE
CLUE CELLS VAG QL WET PREP: NORMAL
COLOR UR: ABNORMAL
EPITH CASTS URNS QL MICRO: ABNORMAL /LPF
GLUCOSE UR STRIP.AUTO-MCNC: NEGATIVE MG/DL
HCG UR QL: NEGATIVE
HGB UR QL STRIP: NEGATIVE
HYALINE CASTS URNS QL MICRO: ABNORMAL /LPF (ref 0–5)
KETONES UR QL STRIP.AUTO: 15 MG/DL
KOH PREP SPEC: NORMAL
LEUKOCYTE ESTERASE UR QL STRIP.AUTO: NEGATIVE
NITRITE UR QL STRIP.AUTO: NEGATIVE
PH UR STRIP: 7.5 [PH] (ref 5–8)
PROT UR STRIP-MCNC: NEGATIVE MG/DL
RBC #/AREA URNS HPF: ABNORMAL /HPF (ref 0–5)
SERVICE CMNT-IMP: NORMAL
SP GR UR REFRACTOMETRY: 1.02 (ref 1–1.03)
T VAGINALIS VAG QL WET PREP: NORMAL
UA: UC IF INDICATED,UAUC: ABNORMAL
UROBILINOGEN UR QL STRIP.AUTO: 1 EU/DL (ref 0.2–1)
WBC URNS QL MICRO: ABNORMAL /HPF (ref 0–4)

## 2022-02-15 PROCEDURE — 81001 URINALYSIS AUTO W/SCOPE: CPT

## 2022-02-15 PROCEDURE — 87491 CHLMYD TRACH DNA AMP PROBE: CPT

## 2022-02-15 PROCEDURE — 87210 SMEAR WET MOUNT SALINE/INK: CPT

## 2022-02-15 PROCEDURE — 81025 URINE PREGNANCY TEST: CPT

## 2022-02-15 PROCEDURE — 99282 EMERGENCY DEPT VISIT SF MDM: CPT

## 2022-02-15 RX ORDER — METRONIDAZOLE 500 MG/1
500 TABLET ORAL 2 TIMES DAILY
Qty: 14 TABLET | Refills: 0 | Status: SHIPPED | OUTPATIENT
Start: 2022-02-15 | End: 2022-02-22

## 2022-02-15 NOTE — ED NOTES
Archibald Holter PA reviewed discharge instructions with the patient. The patient verbalized understanding. All questions and concerns were addressed. The patient declined a wheelchair and is discharged ambulatory in the care of family members with instructions and prescriptions in hand. Pt is alert and oriented x 4. Respirations are clear and unlabored.

## 2022-02-15 NOTE — ED PROVIDER NOTES
EMERGENCY DEPARTMENT HISTORY AND PHYSICAL EXAM      Date: 2/15/2022  Patient Name: Maxwell Suggs    History of Presenting Illness     No chief complaint on file. History Provided By: Patient    HPI: Maxwell Suggs, 24 y.o. female presents to the ED requesting STI testing. Patient denies any current symptoms. Patient states she was exposed to HSV sometime over the past year and was just recently notified. Patient denies F/C, CP, SOB, abdominal pain, N/V/D, unusual vaginal discharge, and vaginal or facial lesions. Patient denies chance of pregnancy. There are no other complaints, changes, or physical findings at this time. PCP: None    No current facility-administered medications on file prior to encounter. Current Outpatient Medications on File Prior to Encounter   Medication Sig Dispense Refill    acetaminophen (TYLENOL) 325 mg tablet Take 3 Tablets by mouth every six (6) hours as needed for Pain. 100 Tablet 1    ibuprofen (MOTRIN) 800 mg tablet Take 1 Tablet by mouth every eight (8) hours. 100 Tablet 1    PNV Comb #2-Iron-FA-Omega 3 29-1-400 mg cmpk Take  by mouth.          Past History     Past Medical History:  Past Medical History:   Diagnosis Date     2020    PO    Anemia     on iron    Bronchitis     10-15 years ago    Gestational hypertension        Past Surgical History:  Past Surgical History:   Procedure Laterality Date    HX HEENT      wisdom teeth extraction 2017    HX OTHER SURGICAL      D&C last september    HX WISDOM TEETH EXTRACTION         Family History:  Family History   Problem Relation Age of Onset    No Known Problems Mother     No Known Problems Father        Social History:  Social History     Tobacco Use    Smoking status: Never Smoker    Smokeless tobacco: Never Used   Substance Use Topics    Alcohol use: No    Drug use: Yes     Types: Marijuana     Comment: 3x week, 20 last use       Allergies:  No Known Allergies      Review of Systems Review of Systems   Constitutional: Negative. Negative for fever. Respiratory: Negative. Negative for cough. Cardiovascular: Negative. Negative for chest pain. Gastrointestinal: Negative. Negative for abdominal pain, diarrhea, nausea and vomiting. Genitourinary: Negative. Negative for dysuria, genital sores, hematuria and vaginal discharge. Skin: Negative. Negative for rash. Allergic/Immunologic: Negative. Negative for environmental allergies and food allergies. Physical Exam   Physical Exam  Vitals reviewed. Constitutional:       General: She is not in acute distress. Appearance: She is well-developed. She is not diaphoretic. Comments: Pt appears well, awake and alert in NAD. HENT:      Head: Normocephalic and atraumatic. Right Ear: External ear normal.      Left Ear: External ear normal.      Nose: Nose normal.   Eyes:      General:         Right eye: No discharge. Left eye: No discharge. Cardiovascular:      Rate and Rhythm: Normal rate. Heart sounds: Normal heart sounds. Pulmonary:      Effort: Pulmonary effort is normal. No respiratory distress. Breath sounds: Normal breath sounds. No wheezing or rales. Skin:     General: Skin is warm and dry. Neurological:      General: No focal deficit present. Mental Status: She is alert. Coordination: Coordination normal.      Comments: No focal neuro deficits.     Psychiatric:         Behavior: Behavior normal.         Diagnostic Study Results     Labs -     Recent Results (from the past 12 hour(s))   URINALYSIS W/ REFLEX CULTURE    Collection Time: 02/15/22  1:29 PM    Specimen: Urine   Result Value Ref Range    Color YELLOW/STRAW      Appearance CLEAR CLEAR      Specific gravity 1.025 1.003 - 1.030      pH (UA) 7.5 5.0 - 8.0      Protein Negative NEG mg/dL    Glucose Negative NEG mg/dL    Ketone 15 (A) NEG mg/dL    Bilirubin Negative NEG      Blood Negative NEG      Urobilinogen 1.0 0.2 - 1.0 EU/dL    Nitrites Negative NEG      Leukocyte Esterase Negative NEG      WBC 0-4 0 - 4 /hpf    RBC 0-5 0 - 5 /hpf    Epithelial cells MODERATE (A) FEW /lpf    Bacteria Negative NEG /hpf    UA:UC IF INDICATED CULTURE NOT INDICATED BY UA RESULT CNI      Hyaline cast 0-2 0 - 5 /lpf   WET PREP    Collection Time: 02/15/22  1:29 PM    Specimen: Miscellaneous sample   Result Value Ref Range    Clue cells CLUE CELLS PRESENT      Wet prep NO TRICHOMONAS SEEN     KOH, OTHER SOURCES    Collection Time: 02/15/22  1:29 PM    Specimen: Cervix; Other   Result Value Ref Range    Special Requests: NO SPECIAL REQUESTS      KOH NO YEAST SEEN     HCG URINE, QL. - POC    Collection Time: 02/15/22  1:29 PM   Result Value Ref Range    Pregnancy test,urine (POC) Negative NEG         Radiologic Studies -   No orders to display     CT Results  (Last 48 hours)    None        CXR Results  (Last 48 hours)    None          Medical Decision Making   I am the first provider for this patient. I reviewed the vital signs, available nursing notes, past medical history, past surgical history, family history and social history. Vital Signs-Reviewed the patient's vital signs. Patient Vitals for the past 12 hrs:   Temp Pulse Resp BP SpO2   02/15/22 1307 98.6 °F (37 °C) 68 16 133/67 100 %             Provider Notes (Medical Decision Making):   Possible STI although low suspicion as patient has no symptoms. No lesions to culture today. Advised patient to return or report to gyn in first 48-72 if symptoms develop for culture and treatment. Patient conveys understanding and agreement. ED Course:   Initial assessment performed. The patients presenting problems have been discussed, and they are in agreement with the care plan formulated and outlined with them. I have encouraged them to ask questions as they arise throughout their visit. DISCHARGE PLAN:  1.    Discharge Medication List as of 2/15/2022  2:32 PM      START taking these medications    Details   metroNIDAZOLE (FlagyL) 500 mg tablet Take 1 Tablet by mouth two (2) times a day for 7 days. , Normal, Disp-14 Tablet, R-0         CONTINUE these medications which have NOT CHANGED    Details   acetaminophen (TYLENOL) 325 mg tablet Take 3 Tablets by mouth every six (6) hours as needed for Pain., Normal, Disp-100 Tablet, R-1      ibuprofen (MOTRIN) 800 mg tablet Take 1 Tablet by mouth every eight (8) hours. , Normal, Disp-100 Tablet, R-1      PNV Comb #2-Iron-FA-Omega 3 29-1-400 mg cmpk Take  by mouth., Historical Med           2. Follow-up Information     Follow up With Specialties Details Why Contact Info    Silvia Mccurdy MD Gynecology, Obstetrics, Obstetrics & Gynecology Schedule an appointment as soon as possible for a visit in 1 week  Lionel Greene [phone] [facility] EMERGENCY DEPT Emergency Medicine  As needed or, If symptoms worsen [address]  [address] [facility]  [phone]        3. Return to ED if worse     Diagnosis     Clinical Impression:   1. BV (bacterial vaginosis)        Attestations:    KAMALA Hartley    Please note that this dictation was completed with [place], the computer voice recognition software. Quite often unanticipated grammatical, syntax, homophones, and other interpretive errors are inadvertently transcribed by the computer software. Please disregard these errors. Please excuse any errors that have escaped final proofreading. Thank you.

## 2022-02-17 LAB
C TRACH RRNA SPEC QL NAA+PROBE: NEGATIVE
N GONORRHOEA RRNA SPEC QL NAA+PROBE: NEGATIVE
SPECIMEN SOURCE: NORMAL

## 2022-03-19 PROBLEM — Z34.90 ENCOUNTER FOR INDUCTION OF LABOR: Status: ACTIVE | Noted: 2021-09-08

## 2022-07-10 ENCOUNTER — HOSPITAL ENCOUNTER (EMERGENCY)
Age: 22
Discharge: STILL ADMITTED OR ADMINISTRATIVE | End: 2022-07-10

## 2022-10-14 LAB
HBSAG, EXTERNAL: NEGATIVE
HEPATITIS C AB,   EXT: NEGATIVE
HIV, EXTERNAL: NON REACTIVE
RPR, EXTERNAL: NON REACTIVE
RUBELLA, EXTERNAL: NORMAL
TYPE, ABO & RH, EXTERNAL: NORMAL

## 2022-11-29 ENCOUNTER — HOSPITAL ENCOUNTER (EMERGENCY)
Age: 22
Discharge: HOME | End: 2022-11-29
Attending: EMERGENCY MEDICINE
Payer: COMMERCIAL

## 2022-11-29 VITALS
HEIGHT: 63 IN | RESPIRATION RATE: 19 BRPM | SYSTOLIC BLOOD PRESSURE: 136 MMHG | WEIGHT: 190 LBS | HEART RATE: 93 BPM | TEMPERATURE: 99.7 F | BODY MASS INDEX: 33.66 KG/M2 | DIASTOLIC BLOOD PRESSURE: 76 MMHG | OXYGEN SATURATION: 99 %

## 2022-11-29 DIAGNOSIS — J10.1 INFLUENZA A: Primary | ICD-10-CM

## 2022-11-29 LAB
ALBUMIN SERPL-MCNC: 3.1 G/DL (ref 3.5–5)
ALBUMIN/GLOB SERPL: 0.7 {RATIO} (ref 1.1–2.2)
ALP SERPL-CCNC: 67 U/L (ref 45–117)
ALT SERPL-CCNC: 30 U/L (ref 12–78)
ANION GAP SERPL CALC-SCNC: 11 MMOL/L (ref 5–15)
AST SERPL-CCNC: 42 U/L (ref 15–37)
BASOPHILS # BLD: 0 K/UL (ref 0–0.1)
BASOPHILS NFR BLD: 0 % (ref 0–1)
BILIRUB SERPL-MCNC: 0.3 MG/DL (ref 0.2–1)
BUN SERPL-MCNC: 6 MG/DL (ref 6–20)
BUN/CREAT SERPL: 11 (ref 12–20)
CALCIUM SERPL-MCNC: 9 MG/DL (ref 8.5–10.1)
CHLORIDE SERPL-SCNC: 99 MMOL/L (ref 97–108)
CO2 SERPL-SCNC: 24 MMOL/L (ref 21–32)
CREAT SERPL-MCNC: 0.54 MG/DL (ref 0.55–1.02)
DIFFERENTIAL METHOD BLD: ABNORMAL
EOSINOPHIL # BLD: 0 K/UL (ref 0–0.4)
EOSINOPHIL NFR BLD: 0 % (ref 0–7)
ERYTHROCYTE [DISTWIDTH] IN BLOOD BY AUTOMATED COUNT: 14.1 % (ref 11.5–14.5)
FLUAV AG NPH QL IA: POSITIVE
FLUBV AG NOSE QL IA: NEGATIVE
GLOBULIN SER CALC-MCNC: 4.4 G/DL (ref 2–4)
GLUCOSE SERPL-MCNC: 84 MG/DL (ref 65–100)
HCT VFR BLD AUTO: 34.8 % (ref 35–47)
HGB BLD-MCNC: 11.8 G/DL (ref 11.5–16)
IMM GRANULOCYTES # BLD AUTO: 0.2 K/UL (ref 0–0.04)
IMM GRANULOCYTES NFR BLD AUTO: 2 % (ref 0–0.5)
LYMPHOCYTES # BLD: 0.4 K/UL (ref 0.8–3.5)
LYMPHOCYTES NFR BLD: 5 % (ref 12–49)
MCH RBC QN AUTO: 29.6 PG (ref 26–34)
MCHC RBC AUTO-ENTMCNC: 33.9 G/DL (ref 30–36.5)
MCV RBC AUTO: 87.4 FL (ref 80–99)
MONOCYTES # BLD: 1.3 K/UL (ref 0–1)
MONOCYTES NFR BLD: 15 % (ref 5–13)
NEUTS SEG # BLD: 6.7 K/UL (ref 1.8–8)
NEUTS SEG NFR BLD: 78 % (ref 32–75)
NRBC # BLD: 0 K/UL (ref 0–0.01)
NRBC BLD-RTO: 0 PER 100 WBC
PLATELET # BLD AUTO: 259 K/UL (ref 150–400)
PMV BLD AUTO: 9.8 FL (ref 8.9–12.9)
POTASSIUM SERPL-SCNC: 3.6 MMOL/L (ref 3.5–5.1)
PROT SERPL-MCNC: 7.5 G/DL (ref 6.4–8.2)
RBC # BLD AUTO: 3.98 M/UL (ref 3.8–5.2)
RBC MORPH BLD: ABNORMAL
SODIUM SERPL-SCNC: 134 MMOL/L (ref 136–145)
WBC # BLD AUTO: 8.6 K/UL (ref 3.6–11)

## 2022-11-29 PROCEDURE — 36415 COLL VENOUS BLD VENIPUNCTURE: CPT

## 2022-11-29 PROCEDURE — 85025 COMPLETE CBC W/AUTO DIFF WBC: CPT

## 2022-11-29 PROCEDURE — 87804 INFLUENZA ASSAY W/OPTIC: CPT

## 2022-11-29 PROCEDURE — 74011250636 HC RX REV CODE- 250/636: Performed by: EMERGENCY MEDICINE

## 2022-11-29 PROCEDURE — 96361 HYDRATE IV INFUSION ADD-ON: CPT

## 2022-11-29 PROCEDURE — 96374 THER/PROPH/DIAG INJ IV PUSH: CPT

## 2022-11-29 PROCEDURE — 80053 COMPREHEN METABOLIC PANEL: CPT

## 2022-11-29 PROCEDURE — 99284 EMERGENCY DEPT VISIT MOD MDM: CPT

## 2022-11-29 RX ORDER — ONDANSETRON 2 MG/ML
4 INJECTION INTRAMUSCULAR; INTRAVENOUS
Status: COMPLETED | OUTPATIENT
Start: 2022-11-29 | End: 2022-11-29

## 2022-11-29 RX ORDER — ONDANSETRON 4 MG/1
4 TABLET, FILM COATED ORAL
Qty: 20 TABLET | Refills: 0 | Status: SHIPPED | OUTPATIENT
Start: 2022-11-29

## 2022-11-29 RX ORDER — OSELTAMIVIR PHOSPHATE 75 MG/1
75 CAPSULE ORAL 2 TIMES DAILY
Qty: 10 CAPSULE | Refills: 0 | Status: SHIPPED | OUTPATIENT
Start: 2022-11-29 | End: 2022-12-04

## 2022-11-29 RX ADMIN — SODIUM CHLORIDE 1000 ML: 9 INJECTION, SOLUTION INTRAVENOUS at 20:31

## 2022-11-29 RX ADMIN — ONDANSETRON 4 MG: 2 INJECTION INTRAMUSCULAR; INTRAVENOUS at 20:32

## 2022-11-29 NOTE — ED TRIAGE NOTES
Pt presents to ED reporting flu-like symptoms x 2 days. Pt endorsing fever, chills, vomiting, body aches, and cough. Pt reports she is currently 20 weeks pregnant and is scheduled to have her anatomy scan tomorrow. .  Pt states she has been unablt to eat in 2 days due to N/V.

## 2022-11-29 NOTE — Clinical Note
[facility] EMERGENCY DEPT  [address] [id number] [phone]    Work/School Note    Date: 11/29/2022    To Whom It May concern:    Julio Huynh was seen and treated today in the emergency room by the following provider(s):  Attending Provider: Ashlee Burks MD.      Julio Huynh is excused from work/school on 11/29/2022 through 12/1/2022. She is medically clear to return to work/school on 12/2/2022.          Sincerely,          Nya Ledezma MD

## 2022-11-30 NOTE — ED NOTES
Discharge instructions were given to the patient by Gabino Bain RN. The patient left the Emergency Department ambulatory, alert and oriented and in no acute distress with 2 prescriptions. The patient was encouraged to call or return to the ED for worsening issues or problems and was encouraged to schedule a follow up appointment for continuing care. The patient verbalized understanding of discharge instructions and prescriptions, all questions were answered. The patient has no further concerns at this time.

## 2022-11-30 NOTE — ED PROVIDER NOTES
EMERGENCY DEPARTMENT HISTORY AND PHYSICAL EXAM      Date: 2022  Patient Name: Marilin Mckeon    History of Presenting Illness     Chief Complaint   Patient presents with    Flu Like Symptoms    Vomiting       History Provided By: Patient    HPI: Marilin Mckeon, 25 y.o. female with PMHx significant for anemia, currently 20 weeks pregnant, who presents with a chief complaint of chills, runny nose, cough, body aches ongoing for 2 days. Also reports nausea and vomiting since yesterday and tells me she is been having difficulty keeping anything down. Did have issues with nausea and vomiting early in pregnancy but states this is improved. She reports chest pain only when coughing. She denies any abdominal pain, vaginal bleeding, loss of vaginal fluid. No complications thus far with this pregnancy. PCP: None    There are no other associated signs or symptoms. No other exacerbating or alleviating factors. There are no other complaints, changes, or physical findings at this time. Current Outpatient Medications   Medication Sig Dispense Refill    ondansetron hcl (Zofran) 4 mg tablet Take 1 Tablet by mouth every eight (8) hours as needed for Nausea. 20 Tablet 0    oseltamivir (Tamiflu) 75 mg capsule Take 1 Capsule by mouth two (2) times a day for 5 days. 10 Capsule 0    acetaminophen (TYLENOL) 325 mg tablet Take 3 Tablets by mouth every six (6) hours as needed for Pain. 100 Tablet 1    ibuprofen (MOTRIN) 800 mg tablet Take 1 Tablet by mouth every eight (8) hours. 100 Tablet 1    PNV Comb #2-Iron-FA-Omega 3 29-1-400 mg cmpk Take  by mouth.        Past History     Past Medical History:  Past Medical History:   Diagnosis Date     2020    PO    Anemia     on iron    Bronchitis     10-15 years ago    Gestational hypertension      Past Surgical History:  Past Surgical History:   Procedure Laterality Date    HX HEENT      wisdom teeth extraction 2017    HX OTHER SURGICAL      D&C last september HX WISDOM TEETH EXTRACTION       Family History:  Family History   Problem Relation Age of Onset    No Known Problems Mother     No Known Problems Father      Social History:  Social History     Tobacco Use    Smoking status: Never    Smokeless tobacco: Never   Substance Use Topics    Alcohol use: No    Drug use: Not Currently     Types: Marijuana     Comment: 3x week, 09/17/20 last use     Allergies:  No Known Allergies  Review of Systems   Review of Systems   Constitutional:  Positive for chills. HENT:  Positive for rhinorrhea. Respiratory:  Positive for cough. Gastrointestinal:  Positive for nausea and vomiting. Musculoskeletal:  Positive for myalgias. All other systems reviewed and are negative. Physical Exam   Physical Exam  Vitals and nursing note reviewed. Constitutional:       General: She is not in acute distress. Appearance: She is well-developed. She is ill-appearing. HENT:      Head: Normocephalic and atraumatic. Eyes:      Conjunctiva/sclera: Conjunctivae normal.      Pupils: Pupils are equal, round, and reactive to light. Cardiovascular:      Rate and Rhythm: Normal rate and regular rhythm. Pulmonary:      Effort: Pulmonary effort is normal. No respiratory distress. Breath sounds: Normal breath sounds. No stridor. Abdominal:      General: There is no distension. Palpations: Abdomen is soft. Tenderness: There is no abdominal tenderness. Musculoskeletal:         General: Normal range of motion. Cervical back: Normal range of motion. Skin:     General: Skin is warm and dry. Neurological:      Mental Status: She is alert and oriented to person, place, and time. Psychiatric:         Mood and Affect: Mood normal.         Thought Content:  Thought content normal.     Diagnostic Study Results   Labs -     Recent Results (from the past 12 hour(s))   INFLUENZA A+B VIRAL AGS    Collection Time: 11/29/22  5:48 PM   Result Value Ref Range    Influenza A Antigen Positive (A) NEG      Influenza B Antigen Negative NEG     CBC WITH AUTOMATED DIFF    Collection Time: 11/29/22  8:30 PM   Result Value Ref Range    WBC 8.6 3.6 - 11.0 K/uL    RBC 3.98 3.80 - 5.20 M/uL    HGB 11.8 11.5 - 16.0 g/dL    HCT 34.8 (L) 35.0 - 47.0 %    MCV 87.4 80.0 - 99.0 FL    MCH 29.6 26.0 - 34.0 PG    MCHC 33.9 30.0 - 36.5 g/dL    RDW 14.1 11.5 - 14.5 %    PLATELET 647 919 - 851 K/uL    MPV 9.8 8.9 - 12.9 FL    NRBC 0.0 0  WBC    ABSOLUTE NRBC 0.00 0.00 - 0.01 K/uL    NEUTROPHILS 78 (H) 32 - 75 %    LYMPHOCYTES 5 (L) 12 - 49 %    MONOCYTES 15 (H) 5 - 13 %    EOSINOPHILS 0 0 - 7 %    BASOPHILS 0 0 - 1 %    IMMATURE GRANULOCYTES 2 (H) 0.0 - 0.5 %    ABS. NEUTROPHILS 6.7 1.8 - 8.0 K/UL    ABS. LYMPHOCYTES 0.4 (L) 0.8 - 3.5 K/UL    ABS. MONOCYTES 1.3 (H) 0.0 - 1.0 K/UL    ABS. EOSINOPHILS 0.0 0.0 - 0.4 K/UL    ABS. BASOPHILS 0.0 0.0 - 0.1 K/UL    ABS. IMM. GRANS. 0.2 (H) 0.00 - 0.04 K/UL    DF SMEAR SCANNED      RBC COMMENTS NORMOCYTIC, NORMOCHROMIC     METABOLIC PANEL, COMPREHENSIVE    Collection Time: 11/29/22  8:30 PM   Result Value Ref Range    Sodium 134 (L) 136 - 145 mmol/L    Potassium 3.6 3.5 - 5.1 mmol/L    Chloride 99 97 - 108 mmol/L    CO2 24 21 - 32 mmol/L    Anion gap 11 5 - 15 mmol/L    Glucose 84 65 - 100 mg/dL    BUN 6 6 - 20 MG/DL    Creatinine 0.54 (L) 0.55 - 1.02 MG/DL    BUN/Creatinine ratio 11 (L) 12 - 20      eGFR >60 >60 ml/min/1.73m2    Calcium 9.0 8.5 - 10.1 MG/DL    Bilirubin, total 0.3 0.2 - 1.0 MG/DL    ALT (SGPT) 30 12 - 78 U/L    AST (SGOT) 42 (H) 15 - 37 U/L    Alk. phosphatase 67 45 - 117 U/L    Protein, total 7.5 6.4 - 8.2 g/dL    Albumin 3.1 (L) 3.5 - 5.0 g/dL    Globulin 4.4 (H) 2.0 - 4.0 g/dL    A-G Ratio 0.7 (L) 1.1 - 2.2         Radiologic Studies -   No orders to display     No results found. Medical Decision Making   I am the first provider for this patient.     I reviewed the vital signs, available nursing notes, past medical history, past surgical history, family history and social history. Vital Signs-Reviewed the patient's vital signs. Patient Vitals for the past 12 hrs:   Temp Pulse Resp BP SpO2   11/29/22 1744 99.7 °F (37.6 °C) 93 19 136/76 99 %       Pulse Oximetry Analysis - 99% on ra      Records Reviewed: Nursing Notes and Old Medical Records    Provider Notes (Medical Decision Making):   Patient presents the chief complaint of chills, runny nose, cough, body aches, nausea, vomiting. Nontoxic-appearing on exam but does appear ill. Afebrile. No focal abdominal tenderness. Given nausea and vomiting will check basic labs to rule out dehydration. Patient had a flu swab prior to my evaluation that is positive for influenza which I suspect is the cause of her symptoms. Given that she is pregnant based on guidelines will prescribe Tamiflu but did discuss GI side effects. ED Course:   Initial assessment performed. The patients presenting problems have been discussed, and they are in agreement with the care plan formulated and outlined with them. I have encouraged them to ask questions as they arise throughout their visit. Lab work without acute findings. Patient feeling better after fluids and antiemetics. Able to tolerate p.o. Stable for discharge. Procedures:  Procedures    Medications Given in ED:  Medications   sodium chloride 0.9 % bolus infusion 1,000 mL (0 mL IntraVENous IV Completed 11/29/22 2154)   ondansetron (ZOFRAN) injection 4 mg (4 mg IntraVENous Given 11/29/22 2032)         Critical Care:  none    Disposition:  Discharge Note:  The patient has been re-evaluated and is ready for discharge. Reviewed available results with patient. Counseled patient on diagnosis and care plan. Patient has expressed understanding, and all questions have been answered. Patient agrees with plan and agrees to follow up as recommended, or to return to the ED if their symptoms worsen.  Discharge instructions have been provided and explained to the patient, along with reasons to return to the ED. PLAN:  1. Discharge Medication List as of 11/29/2022  9:19 PM        START taking these medications    Details   ondansetron hcl (Zofran) 4 mg tablet Take 1 Tablet by mouth every eight (8) hours as needed for Nausea., Normal, Disp-20 Tablet, R-0      oseltamivir (Tamiflu) 75 mg capsule Take 1 Capsule by mouth two (2) times a day for 5 days. , Normal, Disp-10 Capsule, R-0           CONTINUE these medications which have NOT CHANGED    Details   acetaminophen (TYLENOL) 325 mg tablet Take 3 Tablets by mouth every six (6) hours as needed for Pain., Normal, Disp-100 Tablet, R-1      ibuprofen (MOTRIN) 800 mg tablet Take 1 Tablet by mouth every eight (8) hours. , Normal, Disp-100 Tablet, R-1      PNV Comb #2-Iron-FA-Omega 3 29-1-400 mg cmpk Take  by mouth., Historical Med           2. Follow-up Information       Follow up With Specialties Details Why [address] [facility] EMERGENCY DEPT Emergency Medicine  As needed, If symptoms worsen [facility]  [phone]          Return to ED if worse     Diagnosis     Clinical Impression:   1. Influenza A            Please note that this dictation was completed with [employer], the computer voice recognition software. Quite often unanticipated grammatical, syntax, homophones, and other interpretive errors are inadvertently transcribed by the computer software. Please disregard these errors.   Please excuse any errors that have escaped final proofreading

## 2023-03-10 ENCOUNTER — HOSPITAL ENCOUNTER (EMERGENCY)
Age: 23
Discharge: HOME | End: 2023-03-10
Attending: EMERGENCY MEDICINE | Admitting: SPECIALIST
Payer: MEDICAID

## 2023-03-10 ENCOUNTER — HOSPITAL ENCOUNTER (EMERGENCY)
Age: 23
Discharge: LEFT BEFORE DISCHARGE | End: 2023-03-10
Payer: MEDICAID

## 2023-03-10 VITALS
SYSTOLIC BLOOD PRESSURE: 131 MMHG | DIASTOLIC BLOOD PRESSURE: 65 MMHG | TEMPERATURE: 98.4 F | HEIGHT: 63 IN | OXYGEN SATURATION: 99 % | BODY MASS INDEX: 35.27 KG/M2 | RESPIRATION RATE: 16 BRPM | HEART RATE: 82 BPM | WEIGHT: 199.08 LBS

## 2023-03-10 DIAGNOSIS — O26.893 ABDOMINAL PAIN IN PREGNANCY, THIRD TRIMESTER: Primary | ICD-10-CM

## 2023-03-10 DIAGNOSIS — R10.9 ABDOMINAL PAIN IN PREGNANCY, THIRD TRIMESTER: Primary | ICD-10-CM

## 2023-03-10 LAB
APPEARANCE UR: CLEAR
BACTERIA URNS QL MICRO: ABNORMAL /HPF
BASOPHILS # BLD: 0 K/UL (ref 0–0.1)
BASOPHILS NFR BLD: 0 % (ref 0–1)
BILIRUB UR QL: NEGATIVE
COLOR UR: ABNORMAL
DIFFERENTIAL METHOD BLD: ABNORMAL
EOSINOPHIL # BLD: 0 K/UL (ref 0–0.4)
EOSINOPHIL NFR BLD: 0 % (ref 0–7)
EPITH CASTS URNS QL MICRO: ABNORMAL /LPF
ERYTHROCYTE [DISTWIDTH] IN BLOOD BY AUTOMATED COUNT: 14.1 % (ref 11.5–14.5)
GLUCOSE UR STRIP.AUTO-MCNC: NEGATIVE MG/DL
HCT VFR BLD AUTO: 31.7 % (ref 35–47)
HGB BLD-MCNC: 10.5 G/DL (ref 11.5–16)
HGB UR QL STRIP: NEGATIVE
IMM GRANULOCYTES # BLD AUTO: 0 K/UL (ref 0–0.04)
IMM GRANULOCYTES NFR BLD AUTO: 0 % (ref 0–0.5)
KETONES UR QL STRIP.AUTO: 15 MG/DL
LEUKOCYTE ESTERASE UR QL STRIP.AUTO: ABNORMAL
LYMPHOCYTES # BLD: 1.7 K/UL (ref 0.8–3.5)
LYMPHOCYTES NFR BLD: 13 % (ref 12–49)
MCH RBC QN AUTO: 28.4 PG (ref 26–34)
MCHC RBC AUTO-ENTMCNC: 33.1 G/DL (ref 30–36.5)
MCV RBC AUTO: 85.7 FL (ref 80–99)
MONOCYTES # BLD: 0.4 K/UL (ref 0–1)
MONOCYTES NFR BLD: 3 % (ref 5–13)
MYELOCYTES NFR BLD MANUAL: 2 %
NEUTS BAND NFR BLD MANUAL: 8 %
NEUTS SEG # BLD: 11 K/UL (ref 1.8–8)
NEUTS SEG NFR BLD: 74 % (ref 32–75)
NITRITE UR QL STRIP.AUTO: NEGATIVE
NRBC # BLD: 0 K/UL (ref 0–0.01)
NRBC BLD-RTO: 0 PER 100 WBC
PH UR STRIP: 7.5 (ref 5–8)
PLATELET # BLD AUTO: 290 K/UL (ref 150–400)
PMV BLD AUTO: 11.5 FL (ref 8.9–12.9)
PROT UR STRIP-MCNC: 30 MG/DL
RBC # BLD AUTO: 3.7 M/UL (ref 3.8–5.2)
RBC #/AREA URNS HPF: ABNORMAL /HPF (ref 0–5)
RBC MORPH BLD: ABNORMAL
SP GR UR REFRACTOMETRY: 1.02
UA: UC IF INDICATED,UAUC: ABNORMAL
UROBILINOGEN UR QL STRIP.AUTO: 0.2 EU/DL (ref 0.2–1)
WBC # BLD AUTO: 13.4 K/UL (ref 3.6–11)
WBC MORPH BLD: ABNORMAL
WBC URNS QL MICRO: ABNORMAL /HPF (ref 0–4)

## 2023-03-10 PROCEDURE — 81001 URINALYSIS AUTO W/SCOPE: CPT

## 2023-03-10 PROCEDURE — 85025 COMPLETE CBC W/AUTO DIFF WBC: CPT

## 2023-03-10 PROCEDURE — 99285 EMERGENCY DEPT VISIT HI MDM: CPT

## 2023-03-10 PROCEDURE — 75810000275 HC EMERGENCY DEPT VISIT NO LEVEL OF CARE

## 2023-03-10 PROCEDURE — 74011250636 HC RX REV CODE- 250/636: Performed by: SPECIALIST

## 2023-03-10 PROCEDURE — 59025 FETAL NON-STRESS TEST: CPT

## 2023-03-10 RX ADMIN — SODIUM CHLORIDE, POTASSIUM CHLORIDE, SODIUM LACTATE AND CALCIUM CHLORIDE 1000 ML: 600; 310; 30; 20 INJECTION, SOLUTION INTRAVENOUS at 08:22

## 2023-03-10 NOTE — PROGRESS NOTES
0730:  Lizzy Perez is a 25 y.o.   at 34w2d patient of Dr Margarita Perez at [facility] who presents to L&D triage with c/o cramping since yesterday morning. She reports Positive FM, denies vaginal bleeding, LOF, and contractions. She also denies Headaches, Scotoma, RUQ pain, and Edema. Urine sample obtained. EFM and toco placed for initial assessment. 3745: SVE performed by Marcela Leiva MD; closed thick cervix; strip reviewed at this time    [phone]: Called lab to verify that CBC and urinalysis had been received via tube station; per lab they will be running her tests next    [phone]: All labs back at this time and Marcela Leiva MD; per MD pt safe to go home and go to scheduled appt 3/16 at [address] [place] [address]: Patient education provided regarding discharge. Opportunity given for patient to ask questions all questions answered appropriately. Patient ambulated off unit. Patient remains remains pregnant and plans to deliver at [facility].

## 2023-03-10 NOTE — ED PROVIDER NOTES
EMERGENCY DEPARTMENT HISTORY AND PHYSICAL EXAM    ----------------------------------------------------------------------------------------  Please note that this dictation was completed with [employer], the [employer] voice recognition software. Quite often unanticipated grammatical, syntax, homophones, and other interpretive errors are inadvertently transcribed by the computer software. Please disregard these errors. Please excuse any errors that have escaped final proofreading.  ---------------------------------------------------------------------------------------      Date: (Not on file)  Patient Name: Jessica Osborne    History of Presenting Illness     Chief Complaint   Patient presents with    Abdominal Pain     Pt is 34 weeks pregnant and is having cramping, which started yesterday, no vaginal bleeding. Pt is with [facility]. Pt denies leaking    Pregnancy Problem       History Provided By: Patient    Yris Rabago is a 25 y.o. female,  at 32weeks, followed by Dr. Marcel Omer for Prenatal care, who presents ambulatory to the ED with c/o noted flank and back cramping has been ongoing since yesterday with concerns for contractions associated with active labor    Pt specifically denies recent fever, congestion, chest pain, shortness of breath, nausea, vomiting, diarrhea, dysuria, or urinary frequency. Current Outpatient Medications   Medication Sig Dispense Refill    ondansetron hcl (Zofran) 4 mg tablet Take 1 Tablet by mouth every eight (8) hours as needed for Nausea. 20 Tablet 0    acetaminophen (TYLENOL) 325 mg tablet Take 3 Tablets by mouth every six (6) hours as needed for Pain. 100 Tablet 1    ibuprofen (MOTRIN) 800 mg tablet Take 1 Tablet by mouth every eight (8) hours. 100 Tablet 1    PNV Comb #2-Iron-FA-Omega 3 29-1-400 mg cmpk Take  by mouth.          Past History     Past Medical History:  Past Medical History:   Diagnosis Date     2020    PO    Anemia     on iron Bronchitis     10-15 years ago    Gestational hypertension        Past Surgical History:  Past Surgical History:   Procedure Laterality Date    HX HEENT      wisdom teeth extraction 09/2017    HX OTHER SURGICAL      D&C last september    HX WISDOM TEETH EXTRACTION         Family History:  Family History   Problem Relation Age of Onset    No Known Problems Mother     No Known Problems Father        Social History:  Social History     Tobacco Use    Smoking status: Never    Smokeless tobacco: Never   Substance Use Topics    Alcohol use: No    Drug use: Not Currently     Types: Marijuana     Comment: 3x week, 09/17/20 last use       Allergies:  No Known Allergies    Physical Exam   Physical Exam  Vitals and nursing note reviewed. Constitutional:       General: She is not in acute distress. Appearance: She is well-developed. She is not diaphoretic. HENT:      Head: Normocephalic and atraumatic. Nose: Nose normal.   Eyes:      General: No scleral icterus. Conjunctiva/sclera: Conjunctivae normal.   Neck:      Trachea: No tracheal deviation. Cardiovascular:      Rate and Rhythm: Normal rate and regular rhythm. Heart sounds: Normal heart sounds. No murmur heard. No friction rub. Pulmonary:      Effort: Pulmonary effort is normal. No respiratory distress. Breath sounds: Normal breath sounds. No stridor. No wheezing or rales. Abdominal:      General: Bowel sounds are normal. There is no distension. Palpations: Abdomen is soft. Tenderness: There is no abdominal tenderness. There is no rebound. Comments: Gravid abdomen   Musculoskeletal:         General: No tenderness. Normal range of motion. Cervical back: Normal range of motion. Skin:     General: Skin is warm and dry. Findings: No rash. Neurological:      Mental Status: She is alert and oriented to person, place, and time. Cranial Nerves: No cranial nerve deficit.    Psychiatric:         Speech: Speech normal.         Behavior: Behavior normal.         Thought Content: Thought content normal.         Judgment: Judgment normal.             ED Course   I am the first provider for this patient. I reviewed the vital signs, available nursing notes, past medical history, past surgical history, family history and social history. Initial assessment performed. The patients presenting problems have been discussed, and they are in agreement with the care plan formulated and outlined with them. I have encouraged them to ask questions as they arise throughout their visit. I reviewed our electronic medical record system for any external medical records that were available that may contribute to the patients current condition, the nursing notes and and vital signs from today's visit          Vital Signs-Reviewed the patient's vital signs. Patient Vitals for the past 12 hrs:   Temp Pulse Resp BP SpO2   03/10/23 0724 99 °F (37.2 °C) (!) 103 16 124/79 99 %       Pulse Oximetry Analysis - 99% on RA      Medical Decision Making:   DDX:  Active labor, false labor, uti, pyelonephritis, round ligament pain, ,ureteral stone, gallbladder dysfunction, abdominal pain in pregnancy    Plan:  26 y/o female with Uterine Contractions/ Abdominal pain in pregnancy    Pt without clear evidence of acute, non-obstetric emergency at this time, will send to L&D for further evaluation and work up. Should pt have continued sx but found to be without active labor, recommend to return to ED. Will send to L&D for further OB evaluation. Disposition:    PLAN:  1. Admit to L&D. Admit Note:  7:23 AM  Pt is being transported to L&D for further work up and evaluation.  They have convey agreement and understanding of plan  Nursing has notified L&D staff of pt's imminent arrival.    Diagnosis     Clinical Impression: Uterine contractions in third trimester

## 2023-03-10 NOTE — H&P
History & Physical    Name: Nicholas Pickering MRN: [id number]  SSN: [id number]    YOB: 2000  Age: 25 y.o. Sex: female      Subjective:     Reason for Admission:  Pregnancy and abdominal pain and contractions    History of Present Illness: Ms. Destiny Love is a 25 y.o.  female with an estimated gestational age of 35w2d with Estimated Date of Delivery: 23. Patient complains of moderate abdominal pain and moderate contractions for 1 days. Pr. Patient denies vaginal bleeding  and vaginal leaking of fluid . OB History    Para Term  AB Living   3 1 1   1 1   SAB IAB Ectopic Molar Multiple Live Births           0 1      # Outcome Date GA Lbr Alex/2nd Weight Sex Delivery Anes PTL Lv   3 Current            2 Term 21 39w0d 08:44 / 00:27 2.66 kg F Vag-Vacuum EPI N JODY      Complications: Fetal Intolerance   1 AB              Past Medical History:   Diagnosis Date     2020    PO    Anemia     on iron    Bronchitis     10-15 years ago    Gestational hypertension      Past Surgical History:   Procedure Laterality Date    HX HEENT      wisdom teeth extraction 2017    HX OTHER SURGICAL      D&C last september    HX WISDOM TEETH EXTRACTION       Social History     Occupational History    Not on file   Tobacco Use    Smoking status: Never    Smokeless tobacco: Never   Substance and Sexual Activity    Alcohol use: No    Drug use: Not Currently     Types: Marijuana     Comment: 3x week, 20 last use    Sexual activity: Yes     Partners: Male     Birth control/protection: None      Family History   Problem Relation Age of Onset    No Known Problems Mother     No Known Problems Father        No Known Allergies  Prior to Admission medications    Medication Sig Start Date End Date Taking? Authorizing Provider   PNV Comb #2-Iron-FA-Omega 3 29-1-400 mg cmpk Take  by mouth.    Yes Provider, Historical   ondansetron hcl (Zofran) 4 mg tablet Take 1 Tablet by mouth every eight (8) hours as needed for Nausea. Patient not taking: Reported on 3/10/2023 11/29/22   Jody Lemus MD   acetaminophen (TYLENOL) 325 mg tablet Take 3 Tablets by mouth every six (6) hours as needed for Pain. Patient not taking: Reported on 3/10/2023 9/11/21   Harpreet Allen MD   ibuprofen (MOTRIN) 800 mg tablet Take 1 Tablet by mouth every eight (8) hours. Patient not taking: Reported on 3/10/2023 9/11/21   Harpreet Allen MD        Review of Systems:       Objective:     Vitals:    Vitals:    03/10/23 0724 03/10/23 0744   BP: 124/79 131/65   Pulse: (!) 103 82   Resp: 16 16   Temp: 99 °F (37.2 °C) 98.4 °F (36.9 °C)   SpO2: 99% 99%   Weight: 90.3 kg (199 lb 1.2 oz)    Height: 5' 3\" (1.6 m)       Temp (24hrs), Av.7 °F (37.1 °C), Min:98.4 °F (36.9 °C), Max:99 °F (37.2 °C)    BP  Min: 124/79  Max: 131/65     Physical Exam:  Heart: Regular rate and rhythm  Lung: normal respiratory effort  Cervical Exam: Closed/Thick/High     Membranes:  Intact  Uterine Activity:  irritability  Fetal Heart Rate:  Reactive  Baseline: 130 per minute       Lab/Data Review:  No results found for this or any previous visit (from the past 24 hour(s)). Assessment and Plan: Active Problems:    * No active hospital problems.  *     34 weeks with abdominal pain contractions cervix closed will send CBC and UA oral hydration and discharge home

## 2023-03-18 LAB — GRBS, EXTERNAL: NEGATIVE

## 2023-04-20 ENCOUNTER — HOSPITAL ENCOUNTER (INPATIENT)
Age: 23
LOS: 3 days | Discharge: HOME | End: 2023-04-23
Attending: SPECIALIST | Admitting: SPECIALIST
Payer: COMMERCIAL

## 2023-04-20 PROBLEM — Z34.90 PREGNANCY: Status: ACTIVE | Noted: 2023-04-20

## 2023-04-20 LAB
ABO + RH BLD: NORMAL
AMPHET UR QL SCN: NEGATIVE
BARBITURATES UR QL SCN: NEGATIVE
BASOPHILS # BLD: 0 K/UL (ref 0–0.1)
BASOPHILS NFR BLD: 0 % (ref 0–1)
BENZODIAZ UR QL: NEGATIVE
BLOOD GROUP ANTIBODIES SERPL: NORMAL
CANNABINOIDS UR QL SCN: POSITIVE
COCAINE UR QL SCN: NEGATIVE
DIFFERENTIAL METHOD BLD: ABNORMAL
DRUG SCRN COMMENT,DRGCM: ABNORMAL
EOSINOPHIL # BLD: 0.1 K/UL (ref 0–0.4)
EOSINOPHIL NFR BLD: 1 % (ref 0–7)
ERYTHROCYTE [DISTWIDTH] IN BLOOD BY AUTOMATED COUNT: 15.8 % (ref 11.5–14.5)
HCT VFR BLD AUTO: 31 % (ref 35–47)
HGB BLD-MCNC: 9.9 G/DL (ref 11.5–16)
IMM GRANULOCYTES # BLD AUTO: 0 K/UL (ref 0–0.04)
IMM GRANULOCYTES NFR BLD AUTO: 0 % (ref 0–0.5)
LYMPHOCYTES # BLD: 3.6 K/UL (ref 0.8–3.5)
LYMPHOCYTES NFR BLD: 25 % (ref 12–49)
MCH RBC QN AUTO: 26.7 PG (ref 26–34)
MCHC RBC AUTO-ENTMCNC: 31.9 G/DL (ref 30–36.5)
MCV RBC AUTO: 83.6 FL (ref 80–99)
METHADONE UR QL: NEGATIVE
MONOCYTES # BLD: 1.3 K/UL (ref 0–1)
MONOCYTES NFR BLD: 9 % (ref 5–13)
NEUTS BAND NFR BLD MANUAL: 2 %
NEUTS SEG # BLD: 9.3 K/UL (ref 1.8–8)
NEUTS SEG NFR BLD: 63 % (ref 32–75)
NRBC # BLD: 0.04 K/UL (ref 0–0.01)
NRBC BLD-RTO: 0.3 PER 100 WBC
OPIATES UR QL: NEGATIVE
PCP UR QL: NEGATIVE
PLATELET # BLD AUTO: 352 K/UL (ref 150–400)
PMV BLD AUTO: 11.4 FL (ref 8.9–12.9)
RBC # BLD AUTO: 3.71 M/UL (ref 3.8–5.2)
RBC MORPH BLD: ABNORMAL
SPECIMEN EXP DATE BLD: NORMAL
WBC # BLD AUTO: 14.3 K/UL (ref 3.6–11)

## 2023-04-20 PROCEDURE — 86900 BLOOD TYPING SEROLOGIC ABO: CPT

## 2023-04-20 PROCEDURE — 36415 COLL VENOUS BLD VENIPUNCTURE: CPT

## 2023-04-20 PROCEDURE — 85025 COMPLETE CBC W/AUTO DIFF WBC: CPT

## 2023-04-20 PROCEDURE — 4A1HXCZ MONITORING OF PRODUCTS OF CONCEPTION, CARDIAC RATE, EXTERNAL APPROACH: ICD-10-PCS | Performed by: SPECIALIST

## 2023-04-20 PROCEDURE — 65410000002 HC RM PRIVATE OB

## 2023-04-20 PROCEDURE — 80307 DRUG TEST PRSMV CHEM ANLYZR: CPT

## 2023-04-20 PROCEDURE — 3E033VJ INTRODUCTION OF OTHER HORMONE INTO PERIPHERAL VEIN, PERCUTANEOUS APPROACH: ICD-10-PCS | Performed by: SPECIALIST

## 2023-04-20 RX ORDER — SODIUM CHLORIDE 0.9 % (FLUSH) 0.9 %
5-40 SYRINGE (ML) INJECTION EVERY 8 HOURS
Status: DISCONTINUED | OUTPATIENT
Start: 2023-04-21 | End: 2023-04-23 | Stop reason: HOSPADM

## 2023-04-20 RX ORDER — SODIUM CHLORIDE 0.9 % (FLUSH) 0.9 %
5-40 SYRINGE (ML) INJECTION AS NEEDED
Status: DISCONTINUED | OUTPATIENT
Start: 2023-04-20 | End: 2023-04-22

## 2023-04-20 RX ORDER — OXYTOCIN/RINGER'S LACTATE 30/500 ML
10 PLASTIC BAG, INJECTION (ML) INTRAVENOUS AS NEEDED
Status: COMPLETED | OUTPATIENT
Start: 2023-04-20 | End: 2023-04-21

## 2023-04-20 RX ORDER — OXYTOCIN/RINGER'S LACTATE 30/500 ML
87.3 PLASTIC BAG, INJECTION (ML) INTRAVENOUS AS NEEDED
Status: COMPLETED | OUTPATIENT
Start: 2023-04-20 | End: 2023-04-21

## 2023-04-21 ENCOUNTER — ANESTHESIA EVENT (OUTPATIENT)
Dept: LABOR AND DELIVERY | Age: 23
End: 2023-04-21
Payer: MEDICAID

## 2023-04-21 ENCOUNTER — ANESTHESIA (OUTPATIENT)
Dept: LABOR AND DELIVERY | Age: 23
End: 2023-04-21
Payer: MEDICAID

## 2023-04-21 PROCEDURE — 74011250636 HC RX REV CODE- 250/636: Performed by: SPECIALIST

## 2023-04-21 PROCEDURE — 74011250637 HC RX REV CODE- 250/637: Performed by: SPECIALIST

## 2023-04-21 PROCEDURE — 75410000003 HC RECOV DEL/VAG/CSECN EA 0.5 HR

## 2023-04-21 PROCEDURE — 3E0P7VZ INTRODUCTION OF HORMONE INTO FEMALE REPRODUCTIVE, VIA NATURAL OR ARTIFICIAL OPENING: ICD-10-PCS | Performed by: SPECIALIST

## 2023-04-21 PROCEDURE — 76060000078 HC EPIDURAL ANESTHESIA

## 2023-04-21 PROCEDURE — 00HU33Z INSERTION OF INFUSION DEVICE INTO SPINAL CANAL, PERCUTANEOUS APPROACH: ICD-10-PCS | Performed by: STUDENT IN AN ORGANIZED HEALTH CARE EDUCATION/TRAINING PROGRAM

## 2023-04-21 PROCEDURE — 74011000250 HC RX REV CODE- 250: Performed by: STUDENT IN AN ORGANIZED HEALTH CARE EDUCATION/TRAINING PROGRAM

## 2023-04-21 PROCEDURE — 75410000000 HC DELIVERY VAGINAL/SINGLE

## 2023-04-21 PROCEDURE — 77030014125 HC TY EPDRL BBMI -B: Performed by: STUDENT IN AN ORGANIZED HEALTH CARE EDUCATION/TRAINING PROGRAM

## 2023-04-21 PROCEDURE — 75410000002 HC LABOR FEE PER 1 HR

## 2023-04-21 PROCEDURE — 65410000002 HC RM PRIVATE OB

## 2023-04-21 RX ORDER — DIPHENHYDRAMINE HYDROCHLORIDE 50 MG/ML
25 INJECTION, SOLUTION INTRAMUSCULAR; INTRAVENOUS ONCE
Status: COMPLETED | OUTPATIENT
Start: 2023-04-21 | End: 2023-04-21

## 2023-04-21 RX ORDER — IBUPROFEN 400 MG/1
800 TABLET ORAL EVERY 8 HOURS
Status: DISCONTINUED | OUTPATIENT
Start: 2023-04-21 | End: 2023-04-23 | Stop reason: HOSPADM

## 2023-04-21 RX ORDER — ACETAMINOPHEN 325 MG/1
650 TABLET ORAL
Status: DISCONTINUED | OUTPATIENT
Start: 2023-04-21 | End: 2023-04-23 | Stop reason: HOSPADM

## 2023-04-21 RX ORDER — HYDROCORTISONE ACETATE PRAMOXINE HCL 2.5; 1 G/100G; G/100G
CREAM TOPICAL AS NEEDED
Status: DISCONTINUED | OUTPATIENT
Start: 2023-04-21 | End: 2023-04-23 | Stop reason: HOSPADM

## 2023-04-21 RX ORDER — OXYTOCIN/RINGER'S LACTATE 30/500 ML
87.3 PLASTIC BAG, INJECTION (ML) INTRAVENOUS AS NEEDED
Status: DISCONTINUED | OUTPATIENT
Start: 2023-04-21 | End: 2023-04-21

## 2023-04-21 RX ORDER — BUPIVACAINE HYDROCHLORIDE AND EPINEPHRINE 2.5; 5 MG/ML; UG/ML
INJECTION, SOLUTION EPIDURAL; INFILTRATION; INTRACAUDAL; PERINEURAL AS NEEDED
Status: DISCONTINUED | OUTPATIENT
Start: 2023-04-21 | End: 2023-04-21 | Stop reason: HOSPADM

## 2023-04-21 RX ORDER — MISOPROSTOL 200 UG/1
800 TABLET ORAL ONCE
Status: COMPLETED | OUTPATIENT
Start: 2023-04-21 | End: 2023-04-21

## 2023-04-21 RX ORDER — OXYCODONE AND ACETAMINOPHEN 5; 325 MG/1; MG/1
1 TABLET ORAL
Status: DISCONTINUED | OUTPATIENT
Start: 2023-04-21 | End: 2023-04-23 | Stop reason: HOSPADM

## 2023-04-21 RX ORDER — FENTANYL/BUPIVACAINE/NS/PF 2-1250MCG
1-16 SYRINGE (ML) EPIDURAL CONTINUOUS
Status: DISCONTINUED | OUTPATIENT
Start: 2023-04-21 | End: 2023-04-21

## 2023-04-21 RX ORDER — EPHEDRINE SULFATE/0.9% NACL/PF 50 MG/5 ML
10 SYRINGE (ML) INTRAVENOUS
Status: DISCONTINUED | OUTPATIENT
Start: 2023-04-21 | End: 2023-04-21

## 2023-04-21 RX ORDER — OXYTOCIN/RINGER'S LACTATE 30/500 ML
10 PLASTIC BAG, INJECTION (ML) INTRAVENOUS AS NEEDED
Status: DISCONTINUED | OUTPATIENT
Start: 2023-04-21 | End: 2023-04-21

## 2023-04-21 RX ORDER — ZOLPIDEM TARTRATE 5 MG/1
5 TABLET ORAL
Status: DISCONTINUED | OUTPATIENT
Start: 2023-04-21 | End: 2023-04-23 | Stop reason: HOSPADM

## 2023-04-21 RX ORDER — NALOXONE HYDROCHLORIDE 0.4 MG/ML
0.4 INJECTION, SOLUTION INTRAMUSCULAR; INTRAVENOUS; SUBCUTANEOUS AS NEEDED
Status: DISCONTINUED | OUTPATIENT
Start: 2023-04-21 | End: 2023-04-23 | Stop reason: HOSPADM

## 2023-04-21 RX ORDER — OXYTOCIN/RINGER'S LACTATE 30/500 ML
0-20 PLASTIC BAG, INJECTION (ML) INTRAVENOUS
Status: DISCONTINUED | OUTPATIENT
Start: 2023-04-21 | End: 2023-04-21

## 2023-04-21 RX ORDER — SODIUM CHLORIDE, SODIUM LACTATE, POTASSIUM CHLORIDE, CALCIUM CHLORIDE 600; 310; 30; 20 MG/100ML; MG/100ML; MG/100ML; MG/100ML
125 INJECTION, SOLUTION INTRAVENOUS CONTINUOUS
Status: DISCONTINUED | OUTPATIENT
Start: 2023-04-21 | End: 2023-04-21

## 2023-04-21 RX ADMIN — SODIUM CHLORIDE, POTASSIUM CHLORIDE, SODIUM LACTATE AND CALCIUM CHLORIDE 125 ML/HR: 600; 310; 30; 20 INJECTION, SOLUTION INTRAVENOUS at 04:01

## 2023-04-21 RX ADMIN — BUPIVACAINE HYDROCHLORIDE AND EPINEPHRINE 5 ML: 2.5; 5 INJECTION, SOLUTION EPIDURAL; INFILTRATION; INTRACAUDAL; PERINEURAL at 09:58

## 2023-04-21 RX ADMIN — Medication 25 MCG: at 02:08

## 2023-04-21 RX ADMIN — Medication 10000 MILLI-UNITS: at 18:33

## 2023-04-21 RX ADMIN — Medication 12 ML/HR: at 10:12

## 2023-04-21 RX ADMIN — IBUPROFEN 800 MG: 400 TABLET, FILM COATED ORAL at 19:59

## 2023-04-21 RX ADMIN — OXYTOCIN 87.3 MILLI-UNITS/MIN: 10 INJECTION INTRAVENOUS at 18:45

## 2023-04-21 RX ADMIN — SODIUM CHLORIDE, POTASSIUM CHLORIDE, SODIUM LACTATE AND CALCIUM CHLORIDE 999 ML/HR: 600; 310; 30; 20 INJECTION, SOLUTION INTRAVENOUS at 09:12

## 2023-04-21 RX ADMIN — MISOPROSTOL 800 MCG: 200 TABLET ORAL at 18:35

## 2023-04-21 RX ADMIN — SODIUM CHLORIDE, POTASSIUM CHLORIDE, SODIUM LACTATE AND CALCIUM CHLORIDE 1000 ML: 600; 310; 30; 20 INJECTION, SOLUTION INTRAVENOUS at 03:00

## 2023-04-21 RX ADMIN — DIPHENHYDRAMINE HYDROCHLORIDE 25 MG: 50 INJECTION, SOLUTION INTRAMUSCULAR; INTRAVENOUS at 17:27

## 2023-04-21 RX ADMIN — ACETAMINOPHEN 650 MG: 325 TABLET ORAL at 19:59

## 2023-04-21 RX ADMIN — Medication 12 ML/HR: at 18:09

## 2023-04-21 RX ADMIN — BUPIVACAINE HYDROCHLORIDE AND EPINEPHRINE 5 ML: 2.5; 5 INJECTION, SOLUTION EPIDURAL; INFILTRATION; INTRACAUDAL; PERINEURAL at 09:55

## 2023-04-21 RX ADMIN — OXYTOCIN 1 MILLI-UNITS/MIN: 10 INJECTION INTRAVENOUS at 08:29

## 2023-04-21 NOTE — ANESTHESIA PREPROCEDURE EVALUATION
Relevant Problems   No relevant active problems       Anesthetic History   No history of anesthetic complications            Review of Systems / Medical History  Patient summary reviewed, nursing notes reviewed and pertinent labs reviewed    Pulmonary              Pertinent negatives: No COPD and asthma  Comments: Hx of bronchitis   Neuro/Psych   Within defined limits           Cardiovascular    Hypertension (gestational HTN)            Pertinent negatives: No past MI and CHF  Exercise tolerance: >4 METS  Comments: EKG (10/2019):   Sinus rhythm with occasional premature ventricular complexes   Otherwise normal ECG    GI/Hepatic/Renal     GERD           Endo/Other        Obesity     Other Findings              Physical Exam    Airway  Mallampati: II  TM Distance: 4 - 6 cm  Neck ROM: normal range of motion   Mouth opening: Normal     Cardiovascular  Regular rate and rhythm,  S1 and S2 normal,  no murmur, click, rub, or gallop             Dental  No notable dental hx       Pulmonary  Breath sounds clear to auscultation               Abdominal  GI exam deferred       Other Findings            Anesthetic Plan    ASA: 2  Anesthesia type: CSE            Anesthetic plan and risks discussed with: Patient

## 2023-04-21 NOTE — ANESTHESIA PROCEDURE NOTES
Epidural Block    Patient location during procedure: OB  Start time: 4/21/2023 9:40 AM  End time: 4/21/2023 10:00 AM  Reason for block: labor epidural  Staffing  Performed: AA   Anesthesiologist: Joss Cano MD  Preanesthetic Checklist  Completed: patient identified, IV checked, site marked, risks and benefits discussed, surgical consent, monitors and equipment checked, pre-op evaluation, timeout performed and fire risk safety assessment completed and verbalized  Block Placement  Patient position: sitting  Prep: DuraPrep  Sterility prep: cap, drape, gloves, hand and mask  Sedation level: no sedation  Patient monitoring: continuous pulse oximetry, frequent blood pressure checks and heart rate  Approach: midline  Location: lumbar  Lumbar location: L2-L3  Epidural  Loss of resistance technique: saline  Guidance: landmark technique  Needle  Needle type: Tuohy   Needle gauge: 17 G  Needle length: 10 cm  Needle insertion depth: 8 cm  Catheter type: multi-orifice  Catheter size: 19 G  Catheter at skin depth: 12 cm  Catheter securement method: clear occlusive dressing and surgical tape  Test dose: negative  Assessment  Sensory level: T10  Block outcome: pain improved  Number of attempts: 3 or more  Procedure assessment: patient tolerated procedure well with no immediate complications

## 2023-04-22 PROCEDURE — 74011250637 HC RX REV CODE- 250/637: Performed by: SPECIALIST

## 2023-04-22 PROCEDURE — 65410000002 HC RM PRIVATE OB

## 2023-04-22 RX ADMIN — ACETAMINOPHEN 650 MG: 325 TABLET ORAL at 05:36

## 2023-04-22 RX ADMIN — ACETAMINOPHEN 650 MG: 325 TABLET ORAL at 13:18

## 2023-04-22 RX ADMIN — IBUPROFEN 800 MG: 400 TABLET, FILM COATED ORAL at 13:18

## 2023-04-22 RX ADMIN — IBUPROFEN 800 MG: 400 TABLET, FILM COATED ORAL at 22:02

## 2023-04-22 RX ADMIN — IBUPROFEN 800 MG: 400 TABLET, FILM COATED ORAL at 05:36

## 2023-04-23 VITALS
HEIGHT: 63 IN | DIASTOLIC BLOOD PRESSURE: 62 MMHG | BODY MASS INDEX: 37.39 KG/M2 | SYSTOLIC BLOOD PRESSURE: 113 MMHG | WEIGHT: 211 LBS | RESPIRATION RATE: 16 BRPM | OXYGEN SATURATION: 99 % | HEART RATE: 78 BPM | TEMPERATURE: 98.1 F

## 2023-04-23 PROCEDURE — 74011250637 HC RX REV CODE- 250/637: Performed by: SPECIALIST

## 2023-04-23 RX ORDER — IBUPROFEN 800 MG/1
800 TABLET ORAL
Qty: 30 TABLET | Refills: 1 | Status: SHIPPED | OUTPATIENT
Start: 2023-04-23

## 2023-04-23 RX ADMIN — IBUPROFEN 800 MG: 400 TABLET, FILM COATED ORAL at 07:10

## 2023-10-02 ENCOUNTER — HOSPITAL ENCOUNTER (EMERGENCY)
Facility: HOSPITAL | Age: 23
Discharge: HOME | End: 2023-10-02
Attending: EMERGENCY MEDICINE
Payer: COMMERCIAL

## 2023-10-02 VITALS
RESPIRATION RATE: 18 BRPM | BODY MASS INDEX: 36.32 KG/M2 | HEART RATE: 78 BPM | HEIGHT: 63 IN | WEIGHT: 205 LBS | DIASTOLIC BLOOD PRESSURE: 92 MMHG | TEMPERATURE: 98.4 F | SYSTOLIC BLOOD PRESSURE: 131 MMHG | OXYGEN SATURATION: 100 %

## 2023-10-02 DIAGNOSIS — G44.229 CHRONIC TENSION-TYPE HEADACHE, NOT INTRACTABLE: Primary | ICD-10-CM

## 2023-10-02 PROCEDURE — 99283 EMERGENCY DEPT VISIT LOW MDM: CPT

## 2023-10-02 RX ORDER — BUTALBITAL, ACETAMINOPHEN AND CAFFEINE 50; 325; 40 MG/1; MG/1; MG/1
1 TABLET ORAL EVERY 4 HOURS PRN
Qty: 10 TABLET | Refills: 0 | Status: SHIPPED | OUTPATIENT
Start: 2023-10-02

## 2023-10-02 ASSESSMENT — LIFESTYLE VARIABLES
HOW MANY STANDARD DRINKS CONTAINING ALCOHOL DO YOU HAVE ON A TYPICAL DAY: 1 OR 2
HOW OFTEN DO YOU HAVE A DRINK CONTAINING ALCOHOL: MONTHLY OR LESS

## 2023-10-02 ASSESSMENT — PAIN SCALES - GENERAL: PAINLEVEL_OUTOF10: 4

## 2023-10-02 ASSESSMENT — PAIN DESCRIPTION - DESCRIPTORS: DESCRIPTORS: THROBBING

## 2023-10-02 ASSESSMENT — PAIN DESCRIPTION - ORIENTATION: ORIENTATION: ANTERIOR

## 2023-10-02 ASSESSMENT — PAIN DESCRIPTION - LOCATION: LOCATION: HEAD

## 2023-10-02 NOTE — ED TRIAGE NOTES
Pt c/o intermittent headaches (every 1-2 weeks) and increasing over the last 3 months. Pt states she has \"spots\" in her vision. Hx headaches and was made to wear glasses as a child. Has not had glasses since 2019. Denies numbness/tingling.  Denies double vision

## 2023-10-02 NOTE — ED PROVIDER NOTES
[facility] EMERGENCY DEPT  EMERGENCY DEPARTMENT ENCOUNTER       Pt Name: Reji Yee  MRN: [id number]  [address] 2000  Date of evaluation: 10/2/2023  Provider: Whitney Meng MD   PCP: No primary care provider on file. Note Started: 3:29 PM EDT 10/2/23     CHIEF COMPLAINT       Chief Complaint   Patient presents with    Headache    Vision Problem        HISTORY OF PRESENT ILLNESS: 1 or more elements      History From: patient, History limited by: none     Reji Yee is a 21 y.o. female who presents the chief complaint of chronic headaches. Please See MDM for Additional Details of the HPI/PMH  Nursing Notes were all reviewed and agreed with or any disagreements were addressed in the HPI. REVIEW OF SYSTEMS        Positives and Pertinent negatives as per HPI. PAST HISTORY     Past Medical History:  Past Medical History:   Diagnosis Date     2020    PO    Anemia     on iron    Bronchitis     10-15 years ago    Gestational hypertension        Past Surgical History:  Past Surgical History:   Procedure Laterality Date    HEENT      wisdom teeth extraction 2017    OTHER SURGICAL HISTORY      D&C last september    WISDOM TOOTH EXTRACTION         Family History:  Family History   Problem Relation Age of Onset    No Known Problems Father     No Known Problems Mother        Social History:  Social History     Tobacco Use    Smoking status: Never    Smokeless tobacco: Never   Substance Use Topics    Alcohol use: Yes    Drug use: Yes     Types: Marijuana (Weed)       Allergies:  No Known Allergies    CURRENT MEDICATIONS      Discharge Medication List as of 10/2/2023  3:32 PM        CONTINUE these medications which have NOT CHANGED    Details   acetaminophen (TYLENOL) 325 MG tablet Take 975 mg by mouth every 6 hours as neededHistorical Med      ibuprofen (ADVIL;MOTRIN) 800 MG tablet Take 800 mg by mouth in the morning and 800 mg at noon and 800 mg in the evening. Historical Med      ondansetron

## 2023-10-02 NOTE — ED NOTES
Pt given discharge papers. E prescriptions for Fioricet sent to patients pharmacy. Pt educated on discharge papers and prescriptions. Pt instructed to follow up with PCP. Pt given work note. Pt verbalizes understanding and denies any further questions. Pt ambulated to waiting room with steady gait, alert and oriented x4.         Mela Grace RN  10/02/23 [id number]

## 2023-11-29 ENCOUNTER — HOSPITAL ENCOUNTER (EMERGENCY)
Facility: HOSPITAL | Age: 23
Discharge: HOME | End: 2023-11-29
Attending: EMERGENCY MEDICINE
Payer: MEDICAID

## 2023-11-29 VITALS
HEART RATE: 57 BPM | WEIGHT: 184 LBS | OXYGEN SATURATION: 100 % | RESPIRATION RATE: 16 BRPM | HEIGHT: 62 IN | BODY MASS INDEX: 33.86 KG/M2 | DIASTOLIC BLOOD PRESSURE: 78 MMHG | TEMPERATURE: 98.3 F | SYSTOLIC BLOOD PRESSURE: 133 MMHG

## 2023-11-29 DIAGNOSIS — Z32.01 POSITIVE PREGNANCY TEST: ICD-10-CM

## 2023-11-29 DIAGNOSIS — R11.2 NAUSEA AND VOMITING, UNSPECIFIED VOMITING TYPE: ICD-10-CM

## 2023-11-29 DIAGNOSIS — O21.0 HYPEREMESIS GRAVIDARUM: Primary | ICD-10-CM

## 2023-11-29 LAB
ALBUMIN SERPL-MCNC: 3.8 G/DL (ref 3.5–5)
ALBUMIN/GLOB SERPL: 0.8 (ref 1.1–2.2)
ALP SERPL-CCNC: 63 U/L (ref 45–117)
ALT SERPL-CCNC: 12 U/L (ref 12–78)
ANION GAP SERPL CALC-SCNC: 11 MMOL/L (ref 5–15)
APPEARANCE UR: ABNORMAL
AST SERPL-CCNC: 37 U/L (ref 15–37)
BACTERIA URNS QL MICRO: NEGATIVE /HPF
BASOPHILS # BLD: 0 K/UL (ref 0–0.1)
BASOPHILS NFR BLD: 0 % (ref 0–1)
BILIRUB SERPL-MCNC: 0.9 MG/DL (ref 0.2–1)
BILIRUB UR QL CFM: NEGATIVE
BUN SERPL-MCNC: 8 MG/DL (ref 6–20)
BUN/CREAT SERPL: 15 (ref 12–20)
CALCIUM SERPL-MCNC: 9.5 MG/DL (ref 8.5–10.1)
CHLORIDE SERPL-SCNC: 98 MMOL/L (ref 97–108)
CO2 SERPL-SCNC: 24 MMOL/L (ref 21–32)
COLOR UR: ABNORMAL
CREAT SERPL-MCNC: 0.53 MG/DL (ref 0.55–1.02)
DIFFERENTIAL METHOD BLD: ABNORMAL
EOSINOPHIL # BLD: 0 K/UL (ref 0–0.4)
EOSINOPHIL NFR BLD: 1 % (ref 0–7)
EPITH CASTS URNS QL MICRO: ABNORMAL /LPF
ERYTHROCYTE [DISTWIDTH] IN BLOOD BY AUTOMATED COUNT: 14.9 % (ref 11.5–14.5)
GLOBULIN SER CALC-MCNC: 4.9 G/DL (ref 2–4)
GLUCOSE SERPL-MCNC: 76 MG/DL (ref 65–100)
GLUCOSE UR STRIP.AUTO-MCNC: NEGATIVE MG/DL
HCG UR QL: POSITIVE
HCT VFR BLD AUTO: 41.4 % (ref 35–47)
HGB BLD-MCNC: 14.1 G/DL (ref 11.5–16)
HGB UR QL STRIP: NEGATIVE
IMM GRANULOCYTES # BLD AUTO: 0.1 K/UL (ref 0–0.04)
IMM GRANULOCYTES NFR BLD AUTO: 1 % (ref 0–0.5)
KETONES UR QL STRIP.AUTO: >80 MG/DL
LEUKOCYTE ESTERASE UR QL STRIP.AUTO: ABNORMAL
LIPASE SERPL-CCNC: 78 U/L (ref 13–75)
LYMPHOCYTES # BLD: 1.9 K/UL (ref 0.8–3.5)
LYMPHOCYTES NFR BLD: 24 % (ref 12–49)
MCH RBC QN AUTO: 28.7 PG (ref 26–34)
MCHC RBC AUTO-ENTMCNC: 34.1 G/DL (ref 30–36.5)
MCV RBC AUTO: 84.3 FL (ref 80–99)
MONOCYTES # BLD: 0.9 K/UL (ref 0–1)
MONOCYTES NFR BLD: 11 % (ref 5–13)
NEUTS SEG # BLD: 5.1 K/UL (ref 1.8–8)
NEUTS SEG NFR BLD: 64 % (ref 32–75)
NITRITE UR QL STRIP.AUTO: NEGATIVE
NRBC # BLD: 0 K/UL (ref 0–0.01)
NRBC BLD-RTO: 0 PER 100 WBC
PH UR STRIP: 6 (ref 5–8)
PLATELET # BLD AUTO: 305 K/UL (ref 150–400)
POTASSIUM SERPL-SCNC: 4.3 MMOL/L (ref 3.5–5.1)
PROT SERPL-MCNC: 8.7 G/DL (ref 6.4–8.2)
PROT UR STRIP-MCNC: 30 MG/DL
RBC # BLD AUTO: 4.91 M/UL (ref 3.8–5.2)
RBC #/AREA URNS HPF: ABNORMAL /HPF (ref 0–5)
SODIUM SERPL-SCNC: 133 MMOL/L (ref 136–145)
SP GR UR REFRACTOMETRY: 1.03 (ref 1–1.03)
URINE CULTURE IF INDICATED: ABNORMAL
UROBILINOGEN UR QL STRIP.AUTO: 1 EU/DL (ref 0.2–1)
WBC # BLD AUTO: 7.9 K/UL (ref 3.6–11)
WBC URNS QL MICRO: ABNORMAL /HPF (ref 0–4)

## 2023-11-29 PROCEDURE — 99284 EMERGENCY DEPT VISIT MOD MDM: CPT

## 2023-11-29 PROCEDURE — 96361 HYDRATE IV INFUSION ADD-ON: CPT

## 2023-11-29 PROCEDURE — 96374 THER/PROPH/DIAG INJ IV PUSH: CPT

## 2023-11-29 PROCEDURE — 81001 URINALYSIS AUTO W/SCOPE: CPT

## 2023-11-29 PROCEDURE — 6360000002 HC RX W HCPCS: Performed by: EMERGENCY MEDICINE

## 2023-11-29 PROCEDURE — 80053 COMPREHEN METABOLIC PANEL: CPT

## 2023-11-29 PROCEDURE — 85025 COMPLETE CBC W/AUTO DIFF WBC: CPT

## 2023-11-29 PROCEDURE — 2580000003 HC RX 258: Performed by: EMERGENCY MEDICINE

## 2023-11-29 PROCEDURE — 83690 ASSAY OF LIPASE: CPT

## 2023-11-29 PROCEDURE — 36415 COLL VENOUS BLD VENIPUNCTURE: CPT

## 2023-11-29 PROCEDURE — 81025 URINE PREGNANCY TEST: CPT

## 2023-11-29 RX ORDER — ONDANSETRON 2 MG/ML
4 INJECTION INTRAMUSCULAR; INTRAVENOUS
Status: COMPLETED | OUTPATIENT
Start: 2023-11-29 | End: 2023-11-29

## 2023-11-29 RX ORDER — 0.9 % SODIUM CHLORIDE 0.9 %
1000 INTRAVENOUS SOLUTION INTRAVENOUS ONCE
Status: COMPLETED | OUTPATIENT
Start: 2023-11-29 | End: 2023-11-29

## 2023-11-29 RX ORDER — ONDANSETRON 4 MG/1
4 TABLET, ORALLY DISINTEGRATING ORAL EVERY 8 HOURS PRN
Qty: 20 TABLET | Refills: 0 | Status: SHIPPED | OUTPATIENT
Start: 2023-11-29

## 2023-11-29 RX ORDER — PNV NO.95/FERROUS FUM/FOLIC AC 28MG-0.8MG
1 TABLET ORAL DAILY
Qty: 30 TABLET | Refills: 1 | Status: SHIPPED | OUTPATIENT
Start: 2023-11-29 | End: 2023-12-29

## 2023-11-29 RX ADMIN — ONDANSETRON 4 MG: 2 INJECTION INTRAMUSCULAR; INTRAVENOUS at 12:29

## 2023-11-29 RX ADMIN — SODIUM CHLORIDE 1000 ML: 9 INJECTION, SOLUTION INTRAVENOUS at 12:29

## 2023-11-29 ASSESSMENT — PAIN - FUNCTIONAL ASSESSMENT: PAIN_FUNCTIONAL_ASSESSMENT: 0-10

## 2023-11-29 ASSESSMENT — PAIN DESCRIPTION - ORIENTATION: ORIENTATION: LEFT;RIGHT

## 2023-11-29 ASSESSMENT — PAIN SCALES - GENERAL: PAINLEVEL_OUTOF10: 8

## 2023-11-29 ASSESSMENT — PAIN DESCRIPTION - LOCATION: LOCATION: ABDOMEN

## 2023-11-29 ASSESSMENT — PAIN DESCRIPTION - DESCRIPTORS: DESCRIPTORS: ACHING

## 2023-11-29 NOTE — ED NOTES
Discharge instructions were given to the patient by Mag Lange RN  . The patient left the Emergency Department alert and oriented and in no acute distress with 2 prescription(s). The patient was encouraged to call or return to the ED for worsening issues or problems and was encouraged to schedule a follow up appointment for continuing care. The patient verbalized understanding of discharge instructions and prescriptions; all questions were answered. The patient has no further concerns at this time.          Mag Lange RN  11/29/23 1500

## 2023-11-29 NOTE — ED PROVIDER NOTES
patient is stable for discharge home. The signs, symptoms, diagnosis, and discharge instructions have been discussed, understanding conveyed, and agreed upon. The patient is to follow up as recommended or return to ER should their symptoms worsen. PATIENT REFERRED TO:  Faye Chau MD  [address]  [address]  [facility]  [phone]    Schedule an appointment as soon as possible for a visit       [facility] [place] [place] [facility] [place] EMERGENCY DEPT  [address]  [phone]    As needed, If symptoms worsen       DISCHARGE MEDICATIONS:     Medication List        START taking these medications      ondansetron 4 MG disintegrating tablet  Commonly known as: ZOFRAN-ODT  Take 1 tablet by mouth every 8 hours as needed for Nausea or Vomiting     Prenatal Vitamin 27-0.8 MG Tabs  Take 1 tablet by mouth daily            ASK your doctor about these medications      acetaminophen 325 MG tablet  Commonly known as: TYLENOL               Where to Get Your Medications        These medications were sent to [facility] [address] [facility] [employer] - [facility], [address] [place] [address] [phone] [facility] [phone] [address], [address] [place] [address]      Phone: [phone]   ondansetron 4 MG disintegrating tablet  Prenatal Vitamin 27-0.8 MG Tabs           DISCONTINUED MEDICATIONS:  Current Discharge Medication List        STOP taking these medications       butalbital-acetaminophen-caffeine (FIORICET, ESGIC) -40 MG per tablet Comments:   Reason for Stopping:         ibuprofen (ADVIL;MOTRIN) 800 MG tablet Comments:   Reason for Stopping:         ondansetron (ZOFRAN) 4 MG tablet Comments:   Reason for Stopping: Aida Nascimento MD am the primary clinician of record. Gisela Disclaimer     Please note that this dictation was completed with ViSSee, the [employer] voice recognition software.   Quite often unanticipated grammatical, syntax, homophones, and other interpretive errors are inadvertently

## 2024-04-15 ENCOUNTER — HOSPITAL ENCOUNTER (EMERGENCY)
Facility: HOSPITAL | Age: 24
Discharge: HOME | End: 2024-04-15
Attending: EMERGENCY MEDICINE
Payer: COMMERCIAL

## 2024-04-15 VITALS
OXYGEN SATURATION: 100 % | TEMPERATURE: 98.5 F | SYSTOLIC BLOOD PRESSURE: 116 MMHG | HEART RATE: 94 BPM | RESPIRATION RATE: 17 BRPM | BODY MASS INDEX: 33.66 KG/M2 | DIASTOLIC BLOOD PRESSURE: 69 MMHG | WEIGHT: 190 LBS | HEIGHT: 63 IN

## 2024-04-15 DIAGNOSIS — R51.9 ACUTE INTRACTABLE HEADACHE, UNSPECIFIED HEADACHE TYPE: Primary | ICD-10-CM

## 2024-04-15 LAB
ANION GAP SERPL CALC-SCNC: 13 MMOL/L (ref 5–15)
APPEARANCE UR: ABNORMAL
BACTERIA URNS QL MICRO: NEGATIVE /HPF
BASOPHILS # BLD: 0.1 K/UL (ref 0–0.1)
BASOPHILS NFR BLD: 1 % (ref 0–1)
BILIRUB UR QL: NEGATIVE
BUN SERPL-MCNC: 11 MG/DL (ref 6–20)
BUN/CREAT SERPL: 13 (ref 12–20)
CALCIUM SERPL-MCNC: 9.5 MG/DL (ref 8.5–10.1)
CHLORIDE SERPL-SCNC: 101 MMOL/L (ref 97–108)
CO2 SERPL-SCNC: 25 MMOL/L (ref 21–32)
COLOR UR: ABNORMAL
CREAT SERPL-MCNC: 0.84 MG/DL (ref 0.55–1.02)
DIFFERENTIAL METHOD BLD: ABNORMAL
EOSINOPHIL # BLD: 0.1 K/UL (ref 0–0.4)
EOSINOPHIL NFR BLD: 2 % (ref 0–7)
EPITH CASTS URNS QL MICRO: ABNORMAL /LPF
ERYTHROCYTE [DISTWIDTH] IN BLOOD BY AUTOMATED COUNT: 15.3 % (ref 11.5–14.5)
GLUCOSE SERPL-MCNC: 82 MG/DL (ref 65–100)
GLUCOSE UR STRIP.AUTO-MCNC: NEGATIVE MG/DL
HCG SERPL QL: NEGATIVE
HCT VFR BLD AUTO: 40.4 % (ref 35–47)
HGB BLD-MCNC: 12.9 G/DL (ref 11.5–16)
HGB UR QL STRIP: NEGATIVE
IMM GRANULOCYTES # BLD AUTO: 0 K/UL (ref 0–0.04)
IMM GRANULOCYTES NFR BLD AUTO: 0 % (ref 0–0.5)
KETONES UR QL STRIP.AUTO: ABNORMAL MG/DL
LEUKOCYTE ESTERASE UR QL STRIP.AUTO: NEGATIVE
LYMPHOCYTES # BLD: 2.9 K/UL (ref 0.8–3.5)
LYMPHOCYTES NFR BLD: 44 % (ref 12–49)
MCH RBC QN AUTO: 27.2 PG (ref 26–34)
MCHC RBC AUTO-ENTMCNC: 31.9 G/DL (ref 30–36.5)
MCV RBC AUTO: 85.1 FL (ref 80–99)
MONOCYTES # BLD: 0.8 K/UL (ref 0–1)
MONOCYTES NFR BLD: 13 % (ref 5–13)
NEUTS SEG # BLD: 2.6 K/UL (ref 1.8–8)
NEUTS SEG NFR BLD: 40 % (ref 32–75)
NITRITE UR QL STRIP.AUTO: NEGATIVE
NRBC # BLD: 0 K/UL (ref 0–0.01)
NRBC BLD-RTO: 0 PER 100 WBC
PH UR STRIP: 6.5 (ref 5–8)
PLATELET # BLD AUTO: 306 K/UL (ref 150–400)
PMV BLD AUTO: 10.4 FL (ref 8.9–12.9)
POTASSIUM SERPL-SCNC: 3.8 MMOL/L (ref 3.5–5.1)
PROT UR STRIP-MCNC: NEGATIVE MG/DL
RBC # BLD AUTO: 4.75 M/UL (ref 3.8–5.2)
RBC #/AREA URNS HPF: ABNORMAL /HPF (ref 0–5)
SODIUM SERPL-SCNC: 139 MMOL/L (ref 136–145)
SP GR UR REFRACTOMETRY: 1.03 (ref 1–1.03)
URINE CULTURE IF INDICATED: ABNORMAL
UROBILINOGEN UR QL STRIP.AUTO: 1 EU/DL (ref 0.2–1)
WBC # BLD AUTO: 6.5 K/UL (ref 3.6–11)
WBC URNS QL MICRO: ABNORMAL /HPF (ref 0–4)

## 2024-04-15 PROCEDURE — 85025 COMPLETE CBC W/AUTO DIFF WBC: CPT

## 2024-04-15 PROCEDURE — 96365 THER/PROPH/DIAG IV INF INIT: CPT

## 2024-04-15 PROCEDURE — 96366 THER/PROPH/DIAG IV INF ADDON: CPT

## 2024-04-15 PROCEDURE — 99284 EMERGENCY DEPT VISIT MOD MDM: CPT

## 2024-04-15 PROCEDURE — 36415 COLL VENOUS BLD VENIPUNCTURE: CPT

## 2024-04-15 PROCEDURE — 6360000002 HC RX W HCPCS: Performed by: EMERGENCY MEDICINE

## 2024-04-15 PROCEDURE — 93005 ELECTROCARDIOGRAM TRACING: CPT | Performed by: EMERGENCY MEDICINE

## 2024-04-15 PROCEDURE — 81001 URINALYSIS AUTO W/SCOPE: CPT

## 2024-04-15 PROCEDURE — 2580000003 HC RX 258: Performed by: EMERGENCY MEDICINE

## 2024-04-15 PROCEDURE — 84703 CHORIONIC GONADOTROPIN ASSAY: CPT

## 2024-04-15 PROCEDURE — 80048 BASIC METABOLIC PNL TOTAL CA: CPT

## 2024-04-15 PROCEDURE — 96375 TX/PRO/DX INJ NEW DRUG ADDON: CPT

## 2024-04-15 RX ORDER — MAGNESIUM SULFATE IN WATER 40 MG/ML
2000 INJECTION, SOLUTION INTRAVENOUS
Status: COMPLETED | OUTPATIENT
Start: 2024-04-15 | End: 2024-04-15

## 2024-04-15 RX ORDER — BUTALBITAL, ACETAMINOPHEN AND CAFFEINE 50; 325; 40 MG/1; MG/1; MG/1
1 TABLET ORAL EVERY 6 HOURS PRN
Qty: 20 TABLET | Refills: 0 | Status: SHIPPED | OUTPATIENT
Start: 2024-04-15

## 2024-04-15 RX ORDER — SODIUM CHLORIDE, SODIUM LACTATE, POTASSIUM CHLORIDE, AND CALCIUM CHLORIDE .6; .31; .03; .02 G/100ML; G/100ML; G/100ML; G/100ML
1000 INJECTION, SOLUTION INTRAVENOUS
Status: COMPLETED | OUTPATIENT
Start: 2024-04-15 | End: 2024-04-15

## 2024-04-15 RX ORDER — DIPHENHYDRAMINE HYDROCHLORIDE 50 MG/ML
25 INJECTION INTRAMUSCULAR; INTRAVENOUS
Status: COMPLETED | OUTPATIENT
Start: 2024-04-15 | End: 2024-04-15

## 2024-04-15 RX ORDER — KETOROLAC TROMETHAMINE 30 MG/ML
15 INJECTION, SOLUTION INTRAMUSCULAR; INTRAVENOUS
Status: COMPLETED | OUTPATIENT
Start: 2024-04-15 | End: 2024-04-15

## 2024-04-15 RX ORDER — PROCHLORPERAZINE EDISYLATE 5 MG/ML
10 INJECTION INTRAMUSCULAR; INTRAVENOUS ONCE
Status: COMPLETED | OUTPATIENT
Start: 2024-04-15 | End: 2024-04-15

## 2024-04-15 RX ADMIN — PROCHLORPERAZINE EDISYLATE 10 MG: 5 INJECTION INTRAMUSCULAR; INTRAVENOUS at 15:27

## 2024-04-15 RX ADMIN — DIPHENHYDRAMINE HYDROCHLORIDE 25 MG: 50 INJECTION, SOLUTION INTRAMUSCULAR; INTRAVENOUS at 15:27

## 2024-04-15 RX ADMIN — MAGNESIUM SULFATE HEPTAHYDRATE 2000 MG: 40 INJECTION, SOLUTION INTRAVENOUS at 15:32

## 2024-04-15 RX ADMIN — KETOROLAC TROMETHAMINE 15 MG: 30 INJECTION, SOLUTION INTRAMUSCULAR; INTRAVENOUS at 15:27

## 2024-04-15 RX ADMIN — SODIUM CHLORIDE, POTASSIUM CHLORIDE, SODIUM LACTATE AND CALCIUM CHLORIDE 1000 ML: 600; 310; 30; 20 INJECTION, SOLUTION INTRAVENOUS at 15:36

## 2024-04-15 ASSESSMENT — PAIN DESCRIPTION - ORIENTATION: ORIENTATION: RIGHT

## 2024-04-15 ASSESSMENT — PAIN - FUNCTIONAL ASSESSMENT: PAIN_FUNCTIONAL_ASSESSMENT: 0-10

## 2024-04-15 ASSESSMENT — PAIN SCALES - GENERAL: PAINLEVEL_OUTOF10: 3

## 2024-04-15 ASSESSMENT — PAIN DESCRIPTION - DESCRIPTORS: DESCRIPTORS: THROBBING

## 2024-04-15 ASSESSMENT — PAIN DESCRIPTION - LOCATION: LOCATION: HEAD

## 2024-04-15 NOTE — ED NOTES
Discussed Magnesium infusion rate with Dr. Pelaez. Provider aware and reports to continue at ordered infusion rate.

## 2024-04-15 NOTE — ED PROVIDER NOTES
[facility] EMERGENCY DEPT  EMERGENCY DEPARTMENT ENCOUNTER       Pt Name: Isiah Overton  MRN: [id number]  Birthdate 2000  Date of evaluation: 4/15/2024  Provider: Jaren Pelaez MD   PCP: No primary care provider on file.  Note Started: 3:15 PM EDT 4/15/24     CHIEF COMPLAINT       Chief Complaint   Patient presents with    Headache     Pt presents ambulatory to ER w/ CC of headache w/ blurry vision that started two days. Reports lightheaded and N/V. Does wear glasses and last time seen eye doctor was 24. Denies dizziness now. Has not taken any medication. Able to walk without any difficulty or losing balance.         HISTORY OF PRESENT ILLNESS: 1 or more elements      History From: patient, History limited by: none     Isiah Overton is a 23 y.o. female with a history of anemia presents emergency department chief plaint of headache.    Patient reports while pregnant she developed headaches which were diagnosed as \"migraines\" by her PCP.  She reports while at work, she developed a gradual onset right-sided \"throbbing\" this was associated with visual \"spots\" and then a worsening headache.  She reports this was associated with photophobia and \"blurred vision.\"  She reports nausea and decreased appetite.  Denies neck pain.  No fevers or chills.    Patient reports her headache has improved since it first began and is currently just a \"throbbing.\"     Please See MDM for Additional Details of the HPI/PMH  Nursing Notes were all reviewed and agreed with or any disagreements were addressed in the HPI.     REVIEW OF SYSTEMS        Positives and Pertinent negatives as per HPI.    PAST HISTORY     Past Medical History:  Past Medical History:   Diagnosis Date     2020    PO    Anemia     on iron    Bronchitis     10-15 years ago    Gestational hypertension        Past Surgical History:  Past Surgical History:   Procedure Laterality Date    HEENT      wisdom teeth extraction 2017    OTHER SURGICAL HISTORY

## 2024-04-15 NOTE — ED NOTES
Discharge instructions were given to the patient by TRENTON Cordoba.     The patient left the Emergency Department alert and oriented and in no acute distress with 1 prescriptions. The patient was encouraged to call or return to the ED for worsening issues or problems and was encouraged to schedule a follow up appointment for continuing care.     Ambulation assessment completed before discharge.  Pt left Emergency Department ambulating at baseline and at expected ambulatory status with no ortho devices  Ortho device education: none    The patient verbalized understanding of discharge instructions and prescriptions, all questions were answered. The patient has no further concerns at this time.      Yes

## 2024-04-15 NOTE — ED NOTES
Pt presents ambulatory to ED complaining of generalized headache for the past few days. Pt hx of migraines. Pt states that she has not tried any interventions prior to arrival. Pt reports she frequently gets nauseous and has vision changes such as light sensitivity, double vision, and loss of vision. Pt denies any symptoms currently except headache. Pt followed by neurology and opthalmology. Pt states she was recently given glasses that improved symptoms temporarily. Pt never has had any scan done of head. Pt is alert and oriented x 4, RR even and unlabored, skin is warm and dry. Assesment completed and pt updated on plan of care.       Emergency Department Nursing Plan of Care       The Nursing Plan of Care is developed from the Nursing assessment and Emergency Department Attending provider initial evaluation.  The plan of care may be reviewed in the “ED Provider note”.    The Plan of Care was developed with the following considerations:   Patient / Family readiness to learn indicated by:verbalized understanding  Persons(s) to be included in education: patient  Barriers to Learning/Limitations:None    Signed

## 2024-04-15 NOTE — DISCHARGE INSTRUCTIONS
You were seen in the emergency department for your symptoms.  Please follow-up with the primary care doctor and neurologist.  Please use the Fioricet as needed.  Return for new or worsening symptoms anytime.

## 2024-04-16 LAB
EKG ATRIAL RATE: 73 BPM
EKG DIAGNOSIS: NORMAL
EKG P AXIS: 53 DEGREES
EKG P-R INTERVAL: 162 MS
EKG Q-T INTERVAL: 382 MS
EKG QRS DURATION: 80 MS
EKG QTC CALCULATION (BAZETT): 420 MS
EKG R AXIS: 48 DEGREES
EKG T AXIS: 54 DEGREES
EKG VENTRICULAR RATE: 73 BPM

## 2024-04-16 PROCEDURE — 93010 ELECTROCARDIOGRAM REPORT: CPT | Performed by: SPECIALIST

## 2024-09-08 ENCOUNTER — APPOINTMENT (OUTPATIENT)
Facility: HOSPITAL | Age: 24
End: 2024-09-08
Payer: COMMERCIAL

## 2024-09-08 ENCOUNTER — HOSPITAL ENCOUNTER (EMERGENCY)
Facility: HOSPITAL | Age: 24
Discharge: HOME | End: 2024-09-08
Payer: COMMERCIAL

## 2024-09-08 VITALS
RESPIRATION RATE: 18 BRPM | HEIGHT: 63 IN | DIASTOLIC BLOOD PRESSURE: 77 MMHG | TEMPERATURE: 97.7 F | OXYGEN SATURATION: 100 % | SYSTOLIC BLOOD PRESSURE: 130 MMHG | WEIGHT: 198.5 LBS | BODY MASS INDEX: 35.17 KG/M2 | HEART RATE: 56 BPM

## 2024-09-08 DIAGNOSIS — R07.89 CHEST PAIN, MUSCULOSKELETAL: Primary | ICD-10-CM

## 2024-09-08 LAB
ALBUMIN SERPL-MCNC: 4 G/DL (ref 3.5–5)
ALBUMIN/GLOB SERPL: 1 (ref 1.1–2.2)
ALP SERPL-CCNC: 58 U/L (ref 45–117)
ALT SERPL-CCNC: 19 U/L (ref 12–78)
ANION GAP SERPL CALC-SCNC: 11 MMOL/L (ref 2–12)
AST SERPL-CCNC: 15 U/L (ref 15–37)
BASOPHILS # BLD: 0 K/UL (ref 0–0.1)
BASOPHILS NFR BLD: 1 % (ref 0–1)
BILIRUB SERPL-MCNC: 0.4 MG/DL (ref 0.2–1)
BUN SERPL-MCNC: 9 MG/DL (ref 6–20)
BUN/CREAT SERPL: 11 (ref 12–20)
CALCIUM SERPL-MCNC: 9.1 MG/DL (ref 8.5–10.1)
CHLORIDE SERPL-SCNC: 103 MMOL/L (ref 97–108)
CO2 SERPL-SCNC: 26 MMOL/L (ref 21–32)
CREAT SERPL-MCNC: 0.79 MG/DL (ref 0.55–1.02)
D DIMER PPP FEU-MCNC: 0.85 MG/L FEU (ref 0–0.65)
DIFFERENTIAL METHOD BLD: NORMAL
EOSINOPHIL # BLD: 0.1 K/UL (ref 0–0.4)
EOSINOPHIL NFR BLD: 2 % (ref 0–7)
ERYTHROCYTE [DISTWIDTH] IN BLOOD BY AUTOMATED COUNT: 13.7 % (ref 11.5–14.5)
GLOBULIN SER CALC-MCNC: 4.1 G/DL (ref 2–4)
GLUCOSE SERPL-MCNC: 83 MG/DL (ref 65–100)
HCG UR QL: NEGATIVE
HCG UR QL: NEGATIVE
HCT VFR BLD AUTO: 40.4 % (ref 35–47)
HGB BLD-MCNC: 13.2 G/DL (ref 11.5–16)
IMM GRANULOCYTES # BLD AUTO: 0 K/UL (ref 0–0.04)
IMM GRANULOCYTES NFR BLD AUTO: 0 % (ref 0–0.5)
LYMPHOCYTES # BLD: 1.8 K/UL (ref 0.8–3.5)
LYMPHOCYTES NFR BLD: 38 % (ref 12–49)
MCH RBC QN AUTO: 28.6 PG (ref 26–34)
MCHC RBC AUTO-ENTMCNC: 32.7 G/DL (ref 30–36.5)
MCV RBC AUTO: 87.6 FL (ref 80–99)
MONOCYTES # BLD: 0.6 K/UL (ref 0–1)
MONOCYTES NFR BLD: 13 % (ref 5–13)
NEUTS SEG # BLD: 2.1 K/UL (ref 1.8–8)
NEUTS SEG NFR BLD: 46 % (ref 32–75)
NRBC # BLD: 0 K/UL (ref 0–0.01)
NRBC BLD-RTO: 0 PER 100 WBC
PLATELET # BLD AUTO: 296 K/UL (ref 150–400)
PMV BLD AUTO: 10.2 FL (ref 8.9–12.9)
POTASSIUM SERPL-SCNC: 4 MMOL/L (ref 3.5–5.1)
PROT SERPL-MCNC: 8.1 G/DL (ref 6.4–8.2)
RBC # BLD AUTO: 4.61 M/UL (ref 3.8–5.2)
SODIUM SERPL-SCNC: 140 MMOL/L (ref 136–145)
TROPONIN I SERPL HS-MCNC: 4 NG/L (ref 0–51)
WBC # BLD AUTO: 4.6 K/UL (ref 3.6–11)

## 2024-09-08 PROCEDURE — 36415 COLL VENOUS BLD VENIPUNCTURE: CPT

## 2024-09-08 PROCEDURE — 81025 URINE PREGNANCY TEST: CPT

## 2024-09-08 PROCEDURE — 85025 COMPLETE CBC W/AUTO DIFF WBC: CPT

## 2024-09-08 PROCEDURE — 93005 ELECTROCARDIOGRAM TRACING: CPT | Performed by: STUDENT IN AN ORGANIZED HEALTH CARE EDUCATION/TRAINING PROGRAM

## 2024-09-08 PROCEDURE — 85379 FIBRIN DEGRADATION QUANT: CPT

## 2024-09-08 PROCEDURE — 71275 CT ANGIOGRAPHY CHEST: CPT

## 2024-09-08 PROCEDURE — 6360000004 HC RX CONTRAST MEDICATION: Performed by: NURSE PRACTITIONER

## 2024-09-08 PROCEDURE — 84484 ASSAY OF TROPONIN QUANT: CPT

## 2024-09-08 PROCEDURE — 99285 EMERGENCY DEPT VISIT HI MDM: CPT

## 2024-09-08 PROCEDURE — 80053 COMPREHEN METABOLIC PANEL: CPT

## 2024-09-08 RX ORDER — IOPAMIDOL 755 MG/ML
100 INJECTION, SOLUTION INTRAVASCULAR
Status: COMPLETED | OUTPATIENT
Start: 2024-09-08 | End: 2024-09-08

## 2024-09-08 RX ADMIN — IOPAMIDOL 100 ML: 755 INJECTION, SOLUTION INTRAVENOUS at 16:41

## 2024-09-08 ASSESSMENT — PAIN DESCRIPTION - DESCRIPTORS: DESCRIPTORS: PRESSURE

## 2024-09-08 ASSESSMENT — PAIN - FUNCTIONAL ASSESSMENT: PAIN_FUNCTIONAL_ASSESSMENT: 0-10

## 2024-09-08 ASSESSMENT — HEART SCORE: ECG: NORMAL

## 2024-09-08 ASSESSMENT — PAIN SCALES - GENERAL: PAINLEVEL_OUTOF10: 6

## 2024-09-08 ASSESSMENT — PAIN DESCRIPTION - LOCATION: LOCATION: CHEST

## 2024-09-08 ASSESSMENT — PAIN DESCRIPTION - ORIENTATION: ORIENTATION: RIGHT

## 2024-09-09 LAB
EKG ATRIAL RATE: 61 BPM
EKG DIAGNOSIS: NORMAL
EKG P AXIS: 45 DEGREES
EKG P-R INTERVAL: 170 MS
EKG Q-T INTERVAL: 406 MS
EKG QRS DURATION: 78 MS
EKG QTC CALCULATION (BAZETT): 408 MS
EKG R AXIS: 41 DEGREES
EKG T AXIS: 34 DEGREES
EKG VENTRICULAR RATE: 61 BPM

## 2024-09-09 PROCEDURE — 93010 ELECTROCARDIOGRAM REPORT: CPT | Performed by: SPECIALIST

## 2025-07-30 ENCOUNTER — HOSPITAL ENCOUNTER (EMERGENCY)
Facility: HOSPITAL | Age: 25
Discharge: HOME | End: 2025-07-30
Attending: STUDENT IN AN ORGANIZED HEALTH CARE EDUCATION/TRAINING PROGRAM
Payer: COMMERCIAL

## 2025-07-30 ENCOUNTER — APPOINTMENT (OUTPATIENT)
Facility: HOSPITAL | Age: 25
End: 2025-07-30
Payer: COMMERCIAL

## 2025-07-30 VITALS
OXYGEN SATURATION: 96 % | WEIGHT: 219.36 LBS | TEMPERATURE: 98 F | HEART RATE: 98 BPM | HEIGHT: 63 IN | SYSTOLIC BLOOD PRESSURE: 141 MMHG | RESPIRATION RATE: 21 BRPM | DIASTOLIC BLOOD PRESSURE: 98 MMHG | BODY MASS INDEX: 38.87 KG/M2

## 2025-07-30 DIAGNOSIS — J45.901 EXACERBATION OF ASTHMA, UNSPECIFIED ASTHMA SEVERITY, UNSPECIFIED WHETHER PERSISTENT: Primary | ICD-10-CM

## 2025-07-30 DIAGNOSIS — J20.8 VIRAL BRONCHITIS: ICD-10-CM

## 2025-07-30 LAB
ALBUMIN SERPL-MCNC: 3.9 G/DL (ref 3.5–5.2)
ALBUMIN/GLOB SERPL: 1 (ref 1.1–2.2)
ALP SERPL-CCNC: 59 U/L (ref 35–104)
ALT SERPL-CCNC: 13 U/L (ref 10–35)
ANION GAP SERPL CALC-SCNC: 14 MMOL/L (ref 2–14)
AST SERPL-CCNC: 18 U/L (ref 10–35)
BASOPHILS # BLD: 0.04 K/UL (ref 0–0.1)
BASOPHILS NFR BLD: 0.5 % (ref 0–1)
BILIRUB SERPL-MCNC: 0.5 MG/DL (ref 0–1.2)
BUN SERPL-MCNC: 7 MG/DL (ref 6–20)
BUN/CREAT SERPL: 10 (ref 12–20)
CALCIUM SERPL-MCNC: 9.6 MG/DL (ref 8.6–10)
CHLORIDE SERPL-SCNC: 102 MMOL/L (ref 98–107)
CO2 SERPL-SCNC: 19 MMOL/L (ref 20–29)
COMMENT:: NORMAL
CREAT SERPL-MCNC: 0.68 MG/DL (ref 0.6–1)
DIFFERENTIAL METHOD BLD: ABNORMAL
EOSINOPHIL # BLD: 0.43 K/UL (ref 0–0.4)
EOSINOPHIL NFR BLD: 5.4 % (ref 0–7)
ERYTHROCYTE [DISTWIDTH] IN BLOOD BY AUTOMATED COUNT: 13.8 % (ref 11.5–14.5)
FLUAV RNA SPEC QL NAA+PROBE: NOT DETECTED
FLUBV RNA SPEC QL NAA+PROBE: NOT DETECTED
GLOBULIN SER CALC-MCNC: 3.9 G/DL (ref 2–4)
GLUCOSE SERPL-MCNC: 113 MG/DL (ref 65–100)
HCT VFR BLD AUTO: 40.3 % (ref 35–47)
HGB BLD-MCNC: 13.1 G/DL (ref 11.5–16)
IMM GRANULOCYTES # BLD AUTO: 0.02 K/UL (ref 0–0.04)
IMM GRANULOCYTES NFR BLD AUTO: 0.3 % (ref 0–0.5)
LYMPHOCYTES # BLD: 1.55 K/UL (ref 0.8–3.5)
LYMPHOCYTES NFR BLD: 19.5 % (ref 12–49)
MAGNESIUM SERPL-MCNC: 1.8 MG/DL (ref 1.6–2.6)
MCH RBC QN AUTO: 28.6 PG (ref 26–34)
MCHC RBC AUTO-ENTMCNC: 32.5 G/DL (ref 30–36.5)
MCV RBC AUTO: 88 FL (ref 80–99)
MONOCYTES # BLD: 0.94 K/UL (ref 0–1)
MONOCYTES NFR BLD: 11.8 % (ref 5–13)
NEUTS SEG # BLD: 4.97 K/UL (ref 1.8–8)
NEUTS SEG NFR BLD: 62.5 % (ref 32–75)
NRBC # BLD: 0 K/UL (ref 0–0.01)
NRBC BLD-RTO: 0 PER 100 WBC
PLATELET # BLD AUTO: 283 K/UL (ref 150–400)
PMV BLD AUTO: 9.7 FL (ref 8.9–12.9)
POTASSIUM SERPL-SCNC: 3.4 MMOL/L (ref 3.5–5.1)
PROT SERPL-MCNC: 7.8 G/DL (ref 6.4–8.3)
RBC # BLD AUTO: 4.58 M/UL (ref 3.8–5.2)
S PYO DNA THROAT QL NAA+PROBE: NOT DETECTED
SARS-COV-2 RNA RESP QL NAA+PROBE: NOT DETECTED
SODIUM SERPL-SCNC: 135 MMOL/L (ref 136–145)
SOURCE: NORMAL
SPECIMEN HOLD: NORMAL
WBC # BLD AUTO: 8 K/UL (ref 3.6–11)

## 2025-07-30 PROCEDURE — 6360000002 HC RX W HCPCS: Performed by: STUDENT IN AN ORGANIZED HEALTH CARE EDUCATION/TRAINING PROGRAM

## 2025-07-30 PROCEDURE — 99285 EMERGENCY DEPT VISIT HI MDM: CPT

## 2025-07-30 PROCEDURE — 87636 SARSCOV2 & INF A&B AMP PRB: CPT

## 2025-07-30 PROCEDURE — 83735 ASSAY OF MAGNESIUM: CPT

## 2025-07-30 PROCEDURE — 71046 X-RAY EXAM CHEST 2 VIEWS: CPT

## 2025-07-30 PROCEDURE — 6370000000 HC RX 637 (ALT 250 FOR IP): Performed by: STUDENT IN AN ORGANIZED HEALTH CARE EDUCATION/TRAINING PROGRAM

## 2025-07-30 PROCEDURE — 87651 STREP A DNA AMP PROBE: CPT

## 2025-07-30 PROCEDURE — 80053 COMPREHEN METABOLIC PANEL: CPT

## 2025-07-30 PROCEDURE — 36415 COLL VENOUS BLD VENIPUNCTURE: CPT

## 2025-07-30 PROCEDURE — 85025 COMPLETE CBC W/AUTO DIFF WBC: CPT

## 2025-07-30 PROCEDURE — 93005 ELECTROCARDIOGRAM TRACING: CPT | Performed by: STUDENT IN AN ORGANIZED HEALTH CARE EDUCATION/TRAINING PROGRAM

## 2025-07-30 PROCEDURE — 94640 AIRWAY INHALATION TREATMENT: CPT

## 2025-07-30 PROCEDURE — 96365 THER/PROPH/DIAG IV INF INIT: CPT

## 2025-07-30 RX ORDER — MAGNESIUM SULFATE IN WATER 40 MG/ML
2000 INJECTION, SOLUTION INTRAVENOUS
Status: COMPLETED | OUTPATIENT
Start: 2025-07-30 | End: 2025-07-30

## 2025-07-30 RX ORDER — GUAIFENESIN 600 MG/1
600 TABLET, EXTENDED RELEASE ORAL
Status: COMPLETED | OUTPATIENT
Start: 2025-07-30 | End: 2025-07-30

## 2025-07-30 RX ORDER — BENZONATATE 200 MG/1
200 CAPSULE ORAL 3 TIMES DAILY PRN
Qty: 30 CAPSULE | Refills: 0 | Status: SHIPPED | OUTPATIENT
Start: 2025-07-30 | End: 2025-08-09

## 2025-07-30 RX ORDER — ALBUTEROL SULFATE 0.83 MG/ML
2.5 SOLUTION RESPIRATORY (INHALATION)
Status: COMPLETED | OUTPATIENT
Start: 2025-07-30 | End: 2025-07-30

## 2025-07-30 RX ORDER — PREDNISONE 20 MG/1
40 TABLET ORAL DAILY
Qty: 10 TABLET | Refills: 0 | Status: SHIPPED | OUTPATIENT
Start: 2025-07-30 | End: 2025-08-04

## 2025-07-30 RX ORDER — GUAIFENESIN 600 MG/1
600 TABLET, EXTENDED RELEASE ORAL 2 TIMES DAILY
Qty: 30 TABLET | Refills: 0 | Status: SHIPPED | OUTPATIENT
Start: 2025-07-30 | End: 2025-08-14

## 2025-07-30 RX ORDER — IPRATROPIUM BROMIDE AND ALBUTEROL SULFATE 2.5; .5 MG/3ML; MG/3ML
1 SOLUTION RESPIRATORY (INHALATION)
Status: COMPLETED | OUTPATIENT
Start: 2025-07-30 | End: 2025-07-30

## 2025-07-30 RX ORDER — BENZONATATE 100 MG/1
200 CAPSULE ORAL
Status: COMPLETED | OUTPATIENT
Start: 2025-07-30 | End: 2025-07-30

## 2025-07-30 RX ORDER — ALBUTEROL SULFATE 90 UG/1
2 INHALANT RESPIRATORY (INHALATION) 4 TIMES DAILY PRN
Qty: 18 G | Refills: 0 | Status: SHIPPED | OUTPATIENT
Start: 2025-07-30

## 2025-07-30 RX ADMIN — ALBUTEROL SULFATE 2.5 MG: 2.5 SOLUTION RESPIRATORY (INHALATION) at 12:40

## 2025-07-30 RX ADMIN — IPRATROPIUM BROMIDE AND ALBUTEROL SULFATE 1 DOSE: .5; 3 SOLUTION RESPIRATORY (INHALATION) at 10:55

## 2025-07-30 RX ADMIN — GUAIFENESIN 600 MG: 600 TABLET, EXTENDED RELEASE ORAL at 11:11

## 2025-07-30 RX ADMIN — BENZONATATE 200 MG: 100 CAPSULE ORAL at 11:11

## 2025-07-30 RX ADMIN — MAGNESIUM SULFATE HEPTAHYDRATE 2000 MG: 40 INJECTION, SOLUTION INTRAVENOUS at 11:11

## 2025-07-30 ASSESSMENT — PAIN - FUNCTIONAL ASSESSMENT
PAIN_FUNCTIONAL_ASSESSMENT: 0-10
PAIN_FUNCTIONAL_ASSESSMENT: ACTIVITIES ARE NOT PREVENTED

## 2025-07-30 ASSESSMENT — PAIN DESCRIPTION - PAIN TYPE: TYPE: ACUTE PAIN

## 2025-07-30 ASSESSMENT — LIFESTYLE VARIABLES
HOW MANY STANDARD DRINKS CONTAINING ALCOHOL DO YOU HAVE ON A TYPICAL DAY: PATIENT DOES NOT DRINK
HOW OFTEN DO YOU HAVE A DRINK CONTAINING ALCOHOL: NEVER

## 2025-07-30 ASSESSMENT — PAIN DESCRIPTION - ORIENTATION: ORIENTATION: MID;UPPER

## 2025-07-30 ASSESSMENT — PAIN SCALES - GENERAL
PAINLEVEL_OUTOF10: 7
PAINLEVEL_OUTOF10: 4

## 2025-07-30 ASSESSMENT — PAIN DESCRIPTION - LOCATION: LOCATION: CHEST

## 2025-07-30 ASSESSMENT — PAIN DESCRIPTION - FREQUENCY: FREQUENCY: INTERMITTENT

## 2025-07-30 ASSESSMENT — PAIN DESCRIPTION - DESCRIPTORS: DESCRIPTORS: ACHING

## 2025-07-30 NOTE — ED PROVIDER NOTES
[facility] EMERGENCY DEPARTMENT  EMERGENCY DEPARTMENT ENCOUNTER       Pt Name: Isiah Overton  MRN: [id number]  Birthdate 2000  Date of evaluation: 2025  Provider: Cas Pastor MD   PCP: No primary care provider on file.  Note Started: 10:41 AM 25     CHIEF COMPLAINT       Chief Complaint   Patient presents with    Shortness of Breath     Pt presents to ED via EMS from home and is able to ambulate off the stretcher. EMS reports pt has been having audible/expiratory wheezing and SOB since last night and progressively worsened about 45 minutes PTA. Pt endorses productive cough. EMS administered 2 neb tx and IV Dex 10mg. H/o childhood asthma. Pt only knows of seasonal allergies.          HISTORY OF PRESENT ILLNESS: 1 or more elements      History From: Patient  None     Isiah Overton is a 25 y.o. female who presents to the emergency department with shortness of breath which began last night and worsened today.  Patient reports cough productive of yellow-green sputum and wheezing.  She reports history of asthma as a child but has not had any exacerbations as an adult.  She reports that she has some sore throat yesterday which seems to have improved today.  Denies associated fevers or chest pain.  Patient was given 10 mg of IV dexamethasone by EMS as well as 2 DuoNebs which did seem to help with symptoms.     Nursing Notes were all reviewed and agreed with or any disagreements were addressed in the HPI.     REVIEW OF SYSTEMS      Review of Systems     Positives and Pertinent negatives as per HPI.    PAST HISTORY     Past Medical History:  Past Medical History:   Diagnosis Date     2020    PO    Anemia     on iron    Bronchitis     10-15 years ago    Gestational hypertension          Past Surgical History:  Past Surgical History:   Procedure Laterality Date    HEENT      wisdom teeth extraction 2017    OTHER SURGICAL HISTORY      D&C last september    WISDOM TOOTH EXTRACTION      TREATMENT RESPONSE -Improved  PERFORMED BY - Self    NOTES   :  I have spent 35 minutes of critical care time involved in lab review, consultations with specialist, family decision- making, bedside attention and documentation. This time excludes time spent in any separate billed procedures.  During this entire length of time I was immediately available to the patient .    Cas Pastor MD     SCREENINGS   NIH Stroke Score       Heart Score       Curb-65          EMERGENCY DEPARTMENT COURSE and DIFFERENTIAL DIAGNOSIS/MDM   Vitals:    Vitals:    07/30/25 1300 07/30/25 1315 07/30/25 1330 07/30/25 1445   BP: (!) 157/96 (!) 140/75 136/60 (!) 141/98   Pulse: 95 78 84 98   Resp: 17 21 23 21   Temp:       TempSrc:       SpO2: 100% 100% 96% 96%   Weight:       Height:                Patient was given the following medications:  Medications   ipratropium 0.5 mg-albuterol 2.5 mg (DUONEB) nebulizer solution 1 Dose (1 Dose Inhalation Given 7/30/25 1055)   guaiFENesin (MUCINEX) extended release tablet 600 mg (600 mg Oral Given 7/30/25 1111)   benzonatate (TESSALON) capsule 200 mg (200 mg Oral Given 7/30/25 1111)   magnesium sulfate 2000 mg in 50 mL IVPB premix ( IntraVENous Stopped 7/30/25 1130)   albuterol (PROVENTIL) (2.5 MG/3ML) 0.083% nebulizer solution 2.5 mg (2.5 mg Nebulization Given 7/30/25 1240)       CONSULTS: (Who and What was discussed)  None      Chronic Conditions: Asthma    Social Determinants affecting Dx or Tx: None    Records Reviewed (source and summary of external notes): Nursing Notes    CC/HPI Summary, DDx, ED Course, and Reassessment:     25-year-old female with previous history of asthma presenting to the emergency department shortness of breath, wheezing, cough in the context of likely viral illness.  Patient still in some degree of respiratory distress no symptoms seem to have improved with nebs given prehospital.  Will treat with 1 more DuoNeb and if patient still wheezing we will do albuterol nebs

## 2025-07-30 NOTE — DISCHARGE INSTRUCTIONS
Thank You!    It was a pleasure taking care of you in our Emergency Department today. We know that when you come to our Emergency Department, you are entrusting us with your health, comfort, and safety. Our physicians and nurses honor that trust, and truly appreciate the opportunity to care for you and your loved ones.      We also value your feedback. If you receive a survey about your Emergency Department experience today, please fill it out.  We care about our patients' feedback, and we listen to what you have to say.  Thank you.    Cas Pastor MD  ________________________________________________________________________  I have included a copy of your lab results and/or radiologic studies from today's visit so you can have them easily available at your follow-up visit. We hope you feel better and please do not hesitate to contact the ED if you have any questions at all!    Recent Results (from the past 12 hours)   EKG 12 Lead    Collection Time: 07/30/25 10:38 AM   Result Value Ref Range    Ventricular Rate 77 BPM    Atrial Rate 77 BPM    P-R Interval 160 ms    QRS Duration 80 ms    Q-T Interval 370 ms    QTc Calculation (Bazett) 418 ms    P Axis 44 degrees    R Axis 54 degrees    T Axis 5 degrees    Diagnosis       Normal sinus rhythm with sinus arrhythmia  Normal ECG  When compared with ECG of 08-Sep-2024 13:12,  No significant change was found     CBC with Auto Differential    Collection Time: 07/30/25 10:47 AM   Result Value Ref Range    WBC 8.0 3.6 - 11.0 K/uL    RBC 4.58 3.80 - 5.20 M/uL    Hemoglobin 13.1 11.5 - 16.0 g/dL    Hematocrit 40.3 35.0 - 47.0 %    MCV 88.0 80.0 - 99.0 FL    MCH 28.6 26.0 - 34.0 PG    MCHC 32.5 30.0 - 36.5 g/dL    RDW 13.8 11.5 - 14.5 %    Platelets 283 150 - 400 K/uL    MPV 9.7 8.9 - 12.9 FL    Nucleated RBCs 0.0 0  WBC    nRBC 0.00 0.00 - 0.01 K/uL    Neutrophils % 62.5 32.0 - 75.0 %    Lymphocytes % 19.5 12.0 - 49.0 %    Monocytes % 11.8 5.0 - 13.0 %    Eosinophils  % 5.4 0.0 - 7.0 %    Basophils % 0.5 0.0 - 1.0 %    Immature Granulocytes % 0.3 0.0 - 0.5 %    Neutrophils Absolute 4.97 1.80 - 8.00 K/UL    Lymphocytes Absolute 1.55 0.80 - 3.50 K/UL    Monocytes Absolute 0.94 0.00 - 1.00 K/UL    Eosinophils Absolute 0.43 (H) 0.00 - 0.40 K/UL    Basophils Absolute 0.04 0.00 - 0.10 K/UL    Immature Granulocytes Absolute 0.02 0.00 - 0.04 K/UL    Differential Type AUTOMATED     Comprehensive Metabolic Panel    Collection Time: 07/30/25 10:47 AM   Result Value Ref Range    Sodium 135 (L) 136 - 145 mmol/L    Potassium 3.4 (L) 3.5 - 5.1 mmol/L    Chloride 102 98 - 107 mmol/L    CO2 19 (L) 20 - 29 mmol/L    Anion Gap 14 2 - 14 mmol/L    Glucose 113 (H) 65 - 100 mg/dL    BUN 7 6 - 20 MG/DL    Creatinine 0.68 0.60 - 1.00 MG/DL    BUN/Creatinine Ratio 10 (L) 12 - 20      Est, Glom Filt Rate >90 >90 ml/min/1.73m2    Calcium 9.6 8.6 - 10.0 MG/DL    Total Bilirubin 0.5 0.0 - 1.2 MG/DL    ALT 13 10 - 35 U/L    AST 18 10 - 35 U/L    Alk Phosphatase 59 35 - 104 U/L    Total Protein 7.8 6.4 - 8.3 g/dL    Albumin 3.9 3.5 - 5.2 g/dL    Globulin 3.9 2.0 - 4.0 g/dL    Albumin/Globulin Ratio 1.0 (L) 1.1 - 2.2     Magnesium    Collection Time: 07/30/25 10:47 AM   Result Value Ref Range    Magnesium 1.8 1.6 - 2.6 mg/dL   COVID-19 & Influenza Combo    Collection Time: 07/30/25 10:47 AM    Specimen: Nasopharyngeal   Result Value Ref Range    Source Nasopharyngeal      SARS-CoV-2, PCR Not detected NOTD      Rapid Influenza A By PCR Not detected NOTD      Rapid Influenza B By PCR Not detected NOTD     Group A Strep by PCR    Collection Time: 07/30/25 10:47 AM    Specimen: Swab; Throat   Result Value Ref Range    Strep Grp A PCR Not detected NOTD     Extra Tubes Hold    Collection Time: 07/30/25 10:47 AM   Result Value Ref Range    Specimen HOld RED KACY SST, PST     Comment:        Add-on orders for these samples will be processed based on acceptable specimen integrity and analyte stability, which may vary by

## 2025-07-30 NOTE — ED NOTES
Pt presents to ED via EMS from home and is able to ambulate off the stretcher. EMS reports pt has been having audible/expiratory wheezing and SOB since last night and progressively worsened about 45 minutes PTA. Pt endorses productive cough. EMS administered 2 neb tx and IV Dex 10mg. H/o childhood asthma. Pt only knows of seasonal allergies.

## 2025-08-01 LAB
EKG ATRIAL RATE: 77 BPM
EKG DIAGNOSIS: NORMAL
EKG P AXIS: 44 DEGREES
EKG P-R INTERVAL: 160 MS
EKG Q-T INTERVAL: 370 MS
EKG QRS DURATION: 80 MS
EKG QTC CALCULATION (BAZETT): 418 MS
EKG R AXIS: 54 DEGREES
EKG T AXIS: 5 DEGREES
EKG VENTRICULAR RATE: 77 BPM

## (undated) DEVICE — HANDLE LT SNAP ON ULT DURABLE LENS FOR TRUMPF ALC DISPOSABLE

## (undated) DEVICE — STERILE POLYISOPRENE POWDER-FREE SURGICAL GLOVES: Brand: PROTEXIS

## (undated) DEVICE — PACK,LITHOTOMY,PK I: Brand: MEDLINE

## (undated) DEVICE — SOLUTION SCRB 4OZ 10% PVP I POVIDONE IOD TOP PAINT EXIDINE

## (undated) DEVICE — NEEDLE SPNL 20GA L3.5IN YEL HUB S STL REG WALL FIT STYL W/

## (undated) DEVICE — CURETTE UTER VAC CRV RIG 7MM -- GYRUS

## (undated) DEVICE — GOWN,SIRUS,FABRNF,XL,20/CS: Brand: MEDLINE

## (undated) DEVICE — STRAP,POSITIONING,KNEE/BODY,FOAM,4X60": Brand: MEDLINE

## (undated) DEVICE — TOWEL SURG W17XL27IN STD BLU COT NONFENESTRATED PREWASHED

## (undated) DEVICE — COLLECTION KT SUC TISS BERK -- GYRUS

## (undated) DEVICE — JELLY,LUBE,STERILE,FLIP TOP,TUBE,2-OZ: Brand: MEDLINE

## (undated) DEVICE — HANDLE SUCT TBNG L6FR DIA3/8IN SWVL W/ M ADPT FOR BERK PMP

## (undated) DEVICE — SPONGE GZ W4XL4IN COT RADPQ HIGHLY ABSRB

## (undated) DEVICE — PREP PAD BNS: Brand: CONVERTORS

## (undated) DEVICE — CATHETER,URETHRAL,REDRUBBER,STRL,14FR: Brand: MEDLINE INDUSTRIES, INC.

## (undated) DEVICE — KIT,1200CC CANISTER,3/16"X6' TUBING: Brand: MEDLINE INDUSTRIES, INC.

## (undated) DEVICE — INFECTION CONTROL KIT SYS